# Patient Record
Sex: MALE | Race: WHITE | NOT HISPANIC OR LATINO | Employment: FULL TIME | URBAN - METROPOLITAN AREA
[De-identification: names, ages, dates, MRNs, and addresses within clinical notes are randomized per-mention and may not be internally consistent; named-entity substitution may affect disease eponyms.]

---

## 2024-11-13 RX ORDER — FLECAINIDE ACETATE 50 MG/1
50 TABLET ORAL 2 TIMES DAILY
COMMUNITY
Start: 2024-09-25

## 2024-11-13 RX ORDER — PREDNISONE 10 MG/1
10 TABLET ORAL
COMMUNITY

## 2024-11-13 RX ORDER — ROSUVASTATIN CALCIUM 20 MG/1
1 TABLET, COATED ORAL DAILY
COMMUNITY
Start: 2020-05-15

## 2024-11-13 RX ORDER — METOPROLOL SUCCINATE 25 MG/1
25 TABLET, EXTENDED RELEASE ORAL
COMMUNITY
Start: 2024-08-29 | End: 2025-02-25

## 2024-11-13 NOTE — PROGRESS NOTES
[facility] Heart and Vascular Electrophysiology    Patient Name: Parminder Knox  Patient : 1975    Referred for  AFib    History of Present Illness:  Parminder Knox is a 49 y.o. year old male patient who presents for evaluation of AF    Paroxysmal Atrial fibrillation dx   Chads Vasc 0  On metoprolol    Left atrial tachycardia in   s/p ablation of left posterior wall focal AT in  at the [facility], also   epicardial PAC/AT ablation attempt in  aborted due to no PAC/Ats during study    Mr. Knox was last seen by Dr. Shay in  and at that time no changes were made.   Starting 2024 he had severe symptomatic episodes of PAC's and atrial tachycardia. He was seen by his EP group who orederd a [email or web address] monitor. He presents today with monitor results and is seeking my opinion on the management of his rhythm problems.    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      Social History:  He has no history on file for tobacco use, alcohol use, and drug use.    Family History:  No family history on file.     Allergies:  Patient has no allergy information on record.    ROS:  A 14 point review of systems was done and is negative other than as stated in HPI    Physical Exam  Constitutional:       Appearance: Normal appearance.   HENT:      Head: Normocephalic and atraumatic.   Eyes:      Pupils: Pupils are equal, round,  and reactive to light.   Cardiovascular:      Rate and Rhythm: Normal rate and regular rhythm.      Heart sounds: No murmur heard.     No friction rub. No gallop.   Pulmonary:      Effort: Pulmonary effort is normal.      Breath sounds: Normal breath sounds.   Musculoskeletal:         General: Normal range of motion.      Cervical back: Normal range of motion.   Skin:     General: Skin is warm and dry.   Neurological:      General: No focal deficit present.      Mental Status: He is alert and oriented to person, place, and time.   Psychiatric:         Mood and Affect: Mood normal.         Behavior: Behavior normal.       Cardiac Testing:  ECG 11/18/2024:  Sinus rhythm @ 71 bpm; Normal intervals    I REVIEWED HIS ZIO MONITOR PRINT OUT:    IT SHOWED EPISODES OF AF; AT AND POSSIBLE AFLUTTER DESPITE FLECAINIDE 50 mg BID AND METOPROLOL 25 mg DAILY      ASSESSMENT / PLAN:  WE DISCUSSED THE RISKS AND BENEFITS OF CATHETER ABLATION FOR   PAROXYSMAL AF; AT AND POSSIBLY AFLUTTER.  I INFORMED HIM THAT IDEALLY HE SHOULD BE OFF PREDNISONE FEW WEEKS PRIOR AND POST ABLATION.  HE UNDERSTANDS AND WANTS TO PROCEED.    SCHEDULE AF ABLATION UNDER GENERAL ANESTHESIA   START ELIQUIS 2 WEEKS PRIOR ABLATION  HOLD FLECAINIDE 2 DAYS PRIOR AND ALL OTHER MEDS IN THE MORNING OF ABLATION INCLUDING ELIQUIS.  DC ELIQUIS 3 MO POST ABLATION  CARTO SYSTEM - ANY EP LAB      MD Ciro Shirley Master Clinician of Cardiovascular [place].   [facility] Heart and Vascular [place].   Director of Electrophysiology Center  Professor of Medicine.   [employer].

## 2024-11-15 PROBLEM — I48.0 PAROXYSMAL ATRIAL FIBRILLATION (MULTI): Status: ACTIVE | Noted: 2018-04-16

## 2024-11-15 PROBLEM — E66.811 OBESITY, CLASS I, BMI 30-34.9: Status: ACTIVE | Noted: 2018-04-17

## 2024-11-15 PROBLEM — R00.2 PALPITATIONS: Status: ACTIVE | Noted: 2024-08-29

## 2024-11-15 PROBLEM — I47.10 PAROXYSMAL SUPRAVENTRICULAR TACHYCARDIA (CMS-HCC): Status: ACTIVE | Noted: 2018-04-15

## 2024-11-18 ENCOUNTER — HOSPITAL ENCOUNTER (OUTPATIENT)
Dept: CARDIOLOGY | Facility: CLINIC | Age: 49
Discharge: HOME | End: 2024-11-18
Payer: COMMERCIAL

## 2024-11-18 ENCOUNTER — OFFICE VISIT (OUTPATIENT)
Dept: CARDIOLOGY | Facility: CLINIC | Age: 49
End: 2024-11-18
Payer: COMMERCIAL

## 2024-11-18 VITALS
HEART RATE: 80 BPM | OXYGEN SATURATION: 97 % | WEIGHT: 249 LBS | BODY MASS INDEX: 30.32 KG/M2 | HEIGHT: 76 IN | SYSTOLIC BLOOD PRESSURE: 120 MMHG | DIASTOLIC BLOOD PRESSURE: 82 MMHG

## 2024-11-18 DIAGNOSIS — I48.0 PAROXYSMAL ATRIAL FIBRILLATION (MULTI): Primary | ICD-10-CM

## 2024-11-18 PROCEDURE — 99214 OFFICE O/P EST MOD 30 MIN: CPT | Performed by: INTERNAL MEDICINE

## 2024-11-18 PROCEDURE — 93005 ELECTROCARDIOGRAM TRACING: CPT

## 2024-11-18 PROCEDURE — 3008F BODY MASS INDEX DOCD: CPT | Performed by: INTERNAL MEDICINE

## 2024-11-18 PROCEDURE — 99204 OFFICE O/P NEW MOD 45 MIN: CPT | Performed by: INTERNAL MEDICINE

## 2024-11-18 ASSESSMENT — PAIN SCALES - GENERAL: PAINLEVEL_OUTOF10: 1

## 2024-11-19 LAB
ATRIAL RATE: 71 BPM
P AXIS: 61 DEGREES
P OFFSET: 199 MS
P ONSET: 132 MS
PR INTERVAL: 184 MS
Q ONSET: 224 MS
QRS COUNT: 12 BEATS
QRS DURATION: 98 MS
QT INTERVAL: 390 MS
QTC CALCULATION(BAZETT): 423 MS
QTC FREDERICIA: 412 MS
R AXIS: 18 DEGREES
T AXIS: 31 DEGREES
T OFFSET: 419 MS
VENTRICULAR RATE: 71 BPM

## 2024-11-29 ENCOUNTER — HOSPITAL ENCOUNTER (OUTPATIENT)
Facility: HOSPITAL | Age: 49
Setting detail: OUTPATIENT SURGERY
End: 2024-11-29
Attending: INTERNAL MEDICINE | Admitting: INTERNAL MEDICINE
Payer: COMMERCIAL

## 2024-11-29 DIAGNOSIS — I48.0 PAROXYSMAL ATRIAL FIBRILLATION (MULTI): ICD-10-CM

## 2025-01-27 DIAGNOSIS — I48.0 PAROXYSMAL ATRIAL FIBRILLATION (MULTI): Primary | ICD-10-CM

## 2025-01-27 DIAGNOSIS — Z01.818 PREOP TESTING: ICD-10-CM

## 2025-01-30 DIAGNOSIS — I48.0 PAROXYSMAL ATRIAL FIBRILLATION (MULTI): Primary | ICD-10-CM

## 2025-01-30 DIAGNOSIS — I48.0 PAROXYSMAL ATRIAL FIBRILLATION (MULTI): ICD-10-CM

## 2025-02-07 LAB
BUN SERPL-MCNC: 16 MG/DL (ref 7–25)
BUN/CREAT SERPL: ABNORMAL (CALC) (ref 6–22)
CALCIUM SERPL-MCNC: 9.5 MG/DL (ref 8.6–10.3)
CHLORIDE SERPL-SCNC: 105 MMOL/L (ref 98–110)
CO2 SERPL-SCNC: 27 MMOL/L (ref 20–32)
CREAT SERPL-MCNC: 0.98 MG/DL (ref 0.7–1.3)
EGFRCR SERPLBLD CKD-EPI 2021: 93 ML/MIN/1.73M2
ERYTHROCYTE [DISTWIDTH] IN BLOOD BY AUTOMATED COUNT: 12.8 % (ref 11–15)
GLUCOSE SERPL-MCNC: 104 MG/DL (ref 65–99)
HCT VFR BLD AUTO: 47.2 % (ref 38.5–50)
HGB BLD-MCNC: 15.8 G/DL (ref 13.2–17.1)
MCH RBC QN AUTO: 30.3 PG (ref 27–33)
MCHC RBC AUTO-ENTMCNC: 33.5 G/DL (ref 32–36)
MCV RBC AUTO: 90.6 FL (ref 80–100)
PLATELET # BLD AUTO: 255 THOUSAND/UL (ref 140–400)
PMV BLD REES-ECKER: 10.6 FL (ref 7.5–12.5)
POTASSIUM SERPL-SCNC: 3.8 MMOL/L (ref 3.5–5.3)
RBC # BLD AUTO: 5.21 MILLION/UL (ref 4.2–5.8)
SODIUM SERPL-SCNC: 140 MMOL/L (ref 135–146)
WBC # BLD AUTO: 7.1 THOUSAND/UL (ref 3.8–10.8)

## 2025-02-10 NOTE — PROGRESS NOTES
Pharmacy Medication History Review    Parminder Knox is a 50 y.o. male who is planned to be admitted for No Principal Problem: There is no principal problem currently on the Problem List. Please update the Problem List and refresh.. Pharmacy called the patient prior to their scheduled procedure and reviewed the patient's upbvi-[id number] medications for accuracy.    Medications ADDED:  none  Medications CHANGED:  none  Medications REMOVED:   Flecainide 50mg  Prednisone 10mg  Rosuvastatin 20mg    Please review updated prior to admission medication list and comments regarding how patient may be taking medications differently by going to Admission tab --> Admission Orders --> Admit Orders / Review prior to admission medications.     Preferred pharmacy, last doses of medications, and allergies to be confirmed with patient by nursing the day of procedure.     Sources used to complete the med history include:  [facility]  Pharmacy dispense history  Patient interview  Chart Review  Care Everywhere     Below are additional concerns with the patient's PTA list.  Patient states they are taking #1 tablet of metoprolol succinate 25mg daily. L.F. 08/29/24 #90/90d. There is no recent fill history to verify dosage    Lucia Simms CPhT  Please reach out via Secure Chat for questions

## 2025-02-11 ENCOUNTER — ANESTHESIA (OUTPATIENT)
Dept: CARDIOLOGY | Facility: HOSPITAL | Age: 50
End: 2025-02-11
Payer: MEDICARE

## 2025-02-11 ENCOUNTER — APPOINTMENT (OUTPATIENT)
Dept: CARDIOLOGY | Facility: HOSPITAL | Age: 50
End: 2025-02-11
Payer: MEDICARE

## 2025-02-11 ENCOUNTER — ANESTHESIA EVENT (OUTPATIENT)
Dept: CARDIOLOGY | Facility: HOSPITAL | Age: 50
End: 2025-02-11
Payer: MEDICARE

## 2025-02-11 ENCOUNTER — HOSPITAL ENCOUNTER (OUTPATIENT)
Facility: HOSPITAL | Age: 50
Discharge: HOME | End: 2025-02-12
Attending: INTERNAL MEDICINE | Admitting: INTERNAL MEDICINE
Payer: MEDICARE

## 2025-02-11 DIAGNOSIS — I48.0 PAROXYSMAL ATRIAL FIBRILLATION (MULTI): ICD-10-CM

## 2025-02-11 DIAGNOSIS — Z86.79 S/P ABLATION OF ATRIAL FIBRILLATION: Primary | ICD-10-CM

## 2025-02-11 DIAGNOSIS — Z98.890 S/P ABLATION OF ATRIAL FIBRILLATION: Primary | ICD-10-CM

## 2025-02-11 PROBLEM — I48.91 ATRIAL FIBRILLATION (MULTI): Status: ACTIVE | Noted: 2025-02-11

## 2025-02-11 LAB
ACT BLD: 208 SEC (ref 82–174)
ACT BLD: 247 SEC (ref 82–174)
ACT BLD: 285 SEC (ref 82–174)
ACT BLD: 326 SEC (ref 82–174)

## 2025-02-11 PROCEDURE — 2500000001 HC RX 250 WO HCPCS SELF ADMINISTERED DRUGS (ALT 637 FOR MEDICARE OP): Performed by: STUDENT IN AN ORGANIZED HEALTH CARE EDUCATION/TRAINING PROGRAM

## 2025-02-11 PROCEDURE — G0269 OCCLUSIVE DEVICE IN VEIN ART: HCPCS | Performed by: INTERNAL MEDICINE

## 2025-02-11 PROCEDURE — 85347 COAGULATION TIME ACTIVATED: CPT

## 2025-02-11 PROCEDURE — 2500000004 HC RX 250 GENERAL PHARMACY W/ HCPCS (ALT 636 FOR OP/ED)

## 2025-02-11 PROCEDURE — 2500000002 HC RX 250 W HCPCS SELF ADMINISTERED DRUGS (ALT 637 FOR MEDICARE OP, ALT 636 FOR OP/ED): Performed by: STUDENT IN AN ORGANIZED HEALTH CARE EDUCATION/TRAINING PROGRAM

## 2025-02-11 PROCEDURE — 2500000001 HC RX 250 WO HCPCS SELF ADMINISTERED DRUGS (ALT 637 FOR MEDICARE OP)

## 2025-02-11 PROCEDURE — C1732 CATH, EP, DIAG/ABL, 3D/VECT: HCPCS | Performed by: INTERNAL MEDICINE

## 2025-02-11 PROCEDURE — C1766 INTRO/SHEATH,STRBLE,NON-PEEL: HCPCS | Performed by: INTERNAL MEDICINE

## 2025-02-11 PROCEDURE — 85347 COAGULATION TIME ACTIVATED: CPT | Performed by: INTERNAL MEDICINE

## 2025-02-11 PROCEDURE — 2720000007 HC OR 272 NO HCPCS: Performed by: INTERNAL MEDICINE

## 2025-02-11 PROCEDURE — C1731 CATH, EP, 20 OR MORE ELEC: HCPCS | Performed by: INTERNAL MEDICINE

## 2025-02-11 PROCEDURE — C1760 CLOSURE DEV, VASC: HCPCS | Performed by: INTERNAL MEDICINE

## 2025-02-11 PROCEDURE — A93656 PR EPHYS EVL TRNSPTL TX ATRIAL FIB ISOLAT PULM VEIN: Performed by: STUDENT IN AN ORGANIZED HEALTH CARE EDUCATION/TRAINING PROGRAM

## 2025-02-11 PROCEDURE — C1759 CATH, INTRA ECHOCARDIOGRAPHY: HCPCS | Performed by: INTERNAL MEDICINE

## 2025-02-11 PROCEDURE — A93656 PR EPHYS EVL TRNSPTL TX ATRIAL FIB ISOLAT PULM VEIN

## 2025-02-11 PROCEDURE — 7100000011 HC EXTENDED STAY RECOVERY HOURLY - NURSING UNIT

## 2025-02-11 PROCEDURE — 3700000002 HC GENERAL ANESTHESIA TIME - EACH INCREMENTAL 1 MINUTE: Performed by: INTERNAL MEDICINE

## 2025-02-11 PROCEDURE — 2780000003 HC OR 278 NO HCPCS: Performed by: INTERNAL MEDICINE

## 2025-02-11 PROCEDURE — 7100000009 HC PHASE TWO TIME - INITIAL BASE CHARGE: Performed by: INTERNAL MEDICINE

## 2025-02-11 PROCEDURE — 2500000004 HC RX 250 GENERAL PHARMACY W/ HCPCS (ALT 636 FOR OP/ED): Performed by: STUDENT IN AN ORGANIZED HEALTH CARE EDUCATION/TRAINING PROGRAM

## 2025-02-11 PROCEDURE — 93005 ELECTROCARDIOGRAM TRACING: CPT

## 2025-02-11 PROCEDURE — 76937 US GUIDE VASCULAR ACCESS: CPT | Performed by: INTERNAL MEDICINE

## 2025-02-11 PROCEDURE — 93010 ELECTROCARDIOGRAM REPORT: CPT | Performed by: INTERNAL MEDICINE

## 2025-02-11 PROCEDURE — 93656 COMPRE EP EVAL ABLTJ ATR FIB: CPT | Performed by: INTERNAL MEDICINE

## 2025-02-11 PROCEDURE — 36620 INSERTION CATHETER ARTERY: CPT

## 2025-02-11 PROCEDURE — 3700000001 HC GENERAL ANESTHESIA TIME - INITIAL BASE CHARGE: Performed by: INTERNAL MEDICINE

## 2025-02-11 PROCEDURE — 7100000010 HC PHASE TWO TIME - EACH INCREMENTAL 1 MINUTE: Performed by: INTERNAL MEDICINE

## 2025-02-11 RX ORDER — LIDOCAINE IN NACL,ISO-OSMOT/PF 30 MG/3 ML
0.1 SYRINGE (ML) INJECTION ONCE
Status: CANCELLED | OUTPATIENT
Start: 2025-02-11 | End: 2025-02-11

## 2025-02-11 RX ORDER — MECLIZINE HYDROCHLORIDE 25 MG/1
25 TABLET ORAL ONCE
Status: COMPLETED | OUTPATIENT
Start: 2025-02-11 | End: 2025-02-11

## 2025-02-11 RX ORDER — ONDANSETRON HYDROCHLORIDE 2 MG/ML
4 INJECTION, SOLUTION INTRAVENOUS ONCE AS NEEDED
Status: CANCELLED | OUTPATIENT
Start: 2025-02-11

## 2025-02-11 RX ORDER — FENTANYL CITRATE 50 UG/ML
12.5 INJECTION, SOLUTION INTRAMUSCULAR; INTRAVENOUS EVERY 5 MIN PRN
Status: CANCELLED | OUTPATIENT
Start: 2025-02-11

## 2025-02-11 RX ORDER — FENTANYL CITRATE 50 UG/ML
INJECTION, SOLUTION INTRAMUSCULAR; INTRAVENOUS AS NEEDED
Status: DISCONTINUED | OUTPATIENT
Start: 2025-02-11 | End: 2025-02-11

## 2025-02-11 RX ORDER — FENTANYL CITRATE 50 UG/ML
25 INJECTION, SOLUTION INTRAMUSCULAR; INTRAVENOUS EVERY 5 MIN PRN
Status: CANCELLED | OUTPATIENT
Start: 2025-02-11

## 2025-02-11 RX ORDER — OXYCODONE AND ACETAMINOPHEN 5; 325 MG/1; MG/1
1 TABLET ORAL ONCE
Status: COMPLETED | OUTPATIENT
Start: 2025-02-11 | End: 2025-02-11

## 2025-02-11 RX ORDER — HEPARIN SODIUM 10000 [USP'U]/100ML
INJECTION, SOLUTION INTRAVENOUS CONTINUOUS PRN
Status: DISCONTINUED | OUTPATIENT
Start: 2025-02-11 | End: 2025-02-11 | Stop reason: HOSPADM

## 2025-02-11 RX ORDER — PHENYLEPHRINE 10 MG/250 ML(40 MCG/ML)IN 0.9 % SOD.CHLORIDE INTRAVENOUS
CONTINUOUS PRN
Status: DISCONTINUED | OUTPATIENT
Start: 2025-02-11 | End: 2025-02-11

## 2025-02-11 RX ORDER — FENTANYL CITRATE 50 UG/ML
50 INJECTION, SOLUTION INTRAMUSCULAR; INTRAVENOUS EVERY 5 MIN PRN
Status: CANCELLED | OUTPATIENT
Start: 2025-02-11

## 2025-02-11 RX ORDER — PHENYLEPHRINE HCL IN 0.9% NACL 0.4MG/10ML
SYRINGE (ML) INTRAVENOUS AS NEEDED
Status: DISCONTINUED | OUTPATIENT
Start: 2025-02-11 | End: 2025-02-11

## 2025-02-11 RX ORDER — ROCURONIUM BROMIDE 10 MG/ML
INJECTION, SOLUTION INTRAVENOUS AS NEEDED
Status: DISCONTINUED | OUTPATIENT
Start: 2025-02-11 | End: 2025-02-11

## 2025-02-11 RX ORDER — ONDANSETRON HYDROCHLORIDE 2 MG/ML
INJECTION, SOLUTION INTRAVENOUS AS NEEDED
Status: DISCONTINUED | OUTPATIENT
Start: 2025-02-11 | End: 2025-02-11

## 2025-02-11 RX ORDER — ACETAMINOPHEN 325 MG/1
650 TABLET ORAL EVERY 6 HOURS PRN
Status: DISCONTINUED | OUTPATIENT
Start: 2025-02-11 | End: 2025-02-12 | Stop reason: HOSPADM

## 2025-02-11 RX ORDER — PANTOPRAZOLE SODIUM 40 MG/1
40 TABLET, DELAYED RELEASE ORAL
Status: DISCONTINUED | OUTPATIENT
Start: 2025-02-12 | End: 2025-02-12 | Stop reason: HOSPADM

## 2025-02-11 RX ORDER — PROTAMINE SULFATE 10 MG/ML
INJECTION, SOLUTION INTRAVENOUS AS NEEDED
Status: DISCONTINUED | OUTPATIENT
Start: 2025-02-11 | End: 2025-02-11

## 2025-02-11 RX ORDER — LIDOCAINE HYDROCHLORIDE 20 MG/ML
INJECTION, SOLUTION INFILTRATION; PERINEURAL AS NEEDED
Status: DISCONTINUED | OUTPATIENT
Start: 2025-02-11 | End: 2025-02-11

## 2025-02-11 RX ORDER — PROPOFOL 10 MG/ML
INJECTION, EMULSION INTRAVENOUS AS NEEDED
Status: DISCONTINUED | OUTPATIENT
Start: 2025-02-11 | End: 2025-02-11

## 2025-02-11 RX ORDER — SODIUM CHLORIDE, SODIUM LACTATE, POTASSIUM CHLORIDE, CALCIUM CHLORIDE 600; 310; 30; 20 MG/100ML; MG/100ML; MG/100ML; MG/100ML
100 INJECTION, SOLUTION INTRAVENOUS CONTINUOUS
Status: CANCELLED | OUTPATIENT
Start: 2025-02-11 | End: 2025-02-12

## 2025-02-11 RX ORDER — MIDAZOLAM HYDROCHLORIDE 1 MG/ML
INJECTION INTRAMUSCULAR; INTRAVENOUS AS NEEDED
Status: DISCONTINUED | OUTPATIENT
Start: 2025-02-11 | End: 2025-02-11

## 2025-02-11 RX ORDER — METOPROLOL SUCCINATE 25 MG/1
25 TABLET, EXTENDED RELEASE ORAL
Status: DISCONTINUED | OUTPATIENT
Start: 2025-02-12 | End: 2025-02-12 | Stop reason: HOSPADM

## 2025-02-11 RX ORDER — HEPARIN SODIUM 1000 [USP'U]/ML
INJECTION, SOLUTION INTRAVENOUS; SUBCUTANEOUS AS NEEDED
Status: DISCONTINUED | OUTPATIENT
Start: 2025-02-11 | End: 2025-02-11 | Stop reason: HOSPADM

## 2025-02-11 RX ORDER — DROPERIDOL 2.5 MG/ML
0.62 INJECTION, SOLUTION INTRAMUSCULAR; INTRAVENOUS ONCE AS NEEDED
Status: CANCELLED | OUTPATIENT
Start: 2025-02-11

## 2025-02-11 RX ADMIN — ONDANSETRON 4 MG: 2 INJECTION INTRAMUSCULAR; INTRAVENOUS at 15:04

## 2025-02-11 RX ADMIN — LIDOCAINE HYDROCHLORIDE 100 MG: 20 INJECTION, SOLUTION INFILTRATION; PERINEURAL at 12:15

## 2025-02-11 RX ADMIN — PHENYLEPHRINE-NACL IV SOLUTION 10 MG/250ML-0.9% 0.2 MCG/KG/MIN: 10-0.9/25 SOLUTION at 12:40

## 2025-02-11 RX ADMIN — FENTANYL CITRATE 100 MCG: 50 INJECTION, SOLUTION INTRAMUSCULAR; INTRAVENOUS at 12:16

## 2025-02-11 RX ADMIN — APIXABAN 5 MG: 5 TABLET, FILM COATED ORAL at 20:37

## 2025-02-11 RX ADMIN — SUGAMMADEX 400 MG: 100 INJECTION, SOLUTION INTRAVENOUS at 15:17

## 2025-02-11 RX ADMIN — ROCURONIUM 100 MG: 50 INJECTION, SOLUTION INTRAVENOUS at 12:17

## 2025-02-11 RX ADMIN — PROTAMINE SULFATE 30 MG: 10 INJECTION, SOLUTION INTRAVENOUS at 15:00

## 2025-02-11 RX ADMIN — Medication 100 MCG: at 12:40

## 2025-02-11 RX ADMIN — Medication 120 MCG: at 12:16

## 2025-02-11 RX ADMIN — PROPOFOL 200 MG: 10 INJECTION, EMULSION INTRAVENOUS at 12:16

## 2025-02-11 RX ADMIN — MIDAZOLAM HYDROCHLORIDE 2 MG: 1 INJECTION, SOLUTION INTRAMUSCULAR; INTRAVENOUS at 12:09

## 2025-02-11 RX ADMIN — Medication 80 MCG: at 12:50

## 2025-02-11 RX ADMIN — ACETAMINOPHEN 650 MG: 325 TABLET ORAL at 17:47

## 2025-02-11 RX ADMIN — Medication 80 MCG: at 15:15

## 2025-02-11 RX ADMIN — DEXAMETHASONE SODIUM PHOSPHATE 6 MG: 4 INJECTION INTRA-ARTICULAR; INTRALESIONAL; INTRAMUSCULAR; INTRAVENOUS; SOFT TISSUE at 12:32

## 2025-02-11 RX ADMIN — OXYCODONE HYDROCHLORIDE AND ACETAMINOPHEN 1 TABLET: 5; 325 TABLET ORAL at 22:00

## 2025-02-11 RX ADMIN — SODIUM CHLORIDE, SODIUM LACTATE, POTASSIUM CHLORIDE, AND CALCIUM CHLORIDE: 600; 310; 30; 20 INJECTION, SOLUTION INTRAVENOUS at 12:09

## 2025-02-11 RX ADMIN — MECLIZINE HYDROCHLORIDE 25 MG: 25 TABLET ORAL at 21:14

## 2025-02-11 RX ADMIN — ROCURONIUM 20 MG: 50 INJECTION, SOLUTION INTRAVENOUS at 13:39

## 2025-02-11 SDOH — SOCIAL STABILITY: SOCIAL INSECURITY
WITHIN THE LAST YEAR, HAVE YOU BEEN RAPED OR FORCED TO HAVE ANY KIND OF SEXUAL ACTIVITY BY YOUR PARTNER OR EX-PARTNER?: NO

## 2025-02-11 SDOH — ECONOMIC STABILITY: FOOD INSECURITY: WITHIN THE PAST 12 MONTHS, YOU WORRIED THAT YOUR FOOD WOULD RUN OUT BEFORE YOU GOT THE MONEY TO BUY MORE.: NEVER TRUE

## 2025-02-11 SDOH — SOCIAL STABILITY: SOCIAL INSECURITY: WITHIN THE LAST YEAR, HAVE YOU BEEN AFRAID OF YOUR PARTNER OR EX-PARTNER?: NO

## 2025-02-11 SDOH — SOCIAL STABILITY: SOCIAL INSECURITY: DO YOU FEEL UNSAFE GOING BACK TO THE PLACE WHERE YOU ARE LIVING?: NO

## 2025-02-11 SDOH — SOCIAL STABILITY: SOCIAL INSECURITY: HAS ANYONE EVER THREATENED TO HURT YOUR FAMILY OR YOUR PETS?: NO

## 2025-02-11 SDOH — SOCIAL STABILITY: SOCIAL INSECURITY: ARE YOU OR HAVE YOU BEEN THREATENED OR ABUSED PHYSICALLY, EMOTIONALLY, OR SEXUALLY BY ANYONE?: NO

## 2025-02-11 SDOH — SOCIAL STABILITY: SOCIAL INSECURITY: HAVE YOU HAD THOUGHTS OF HARMING ANYONE ELSE?: NO

## 2025-02-11 SDOH — SOCIAL STABILITY: SOCIAL INSECURITY: WITHIN THE LAST YEAR, HAVE YOU BEEN HUMILIATED OR EMOTIONALLY ABUSED IN OTHER WAYS BY YOUR PARTNER OR EX-PARTNER?: NO

## 2025-02-11 SDOH — SOCIAL STABILITY: SOCIAL INSECURITY: DO YOU FEEL ANYONE HAS EXPLOITED OR TAKEN ADVANTAGE OF YOU FINANCIALLY OR OF YOUR PERSONAL PROPERTY?: NO

## 2025-02-11 SDOH — SOCIAL STABILITY: SOCIAL INSECURITY
WITHIN THE LAST YEAR, HAVE YOU BEEN KICKED, HIT, SLAPPED, OR OTHERWISE PHYSICALLY HURT BY YOUR PARTNER OR EX-PARTNER?: NO

## 2025-02-11 SDOH — SOCIAL STABILITY: SOCIAL INSECURITY: WERE YOU ABLE TO COMPLETE ALL THE BEHAVIORAL HEALTH SCREENINGS?: YES

## 2025-02-11 SDOH — ECONOMIC STABILITY: INCOME INSECURITY: IN THE PAST 12 MONTHS HAS THE ELECTRIC, GAS, OIL, OR WATER COMPANY THREATENED TO SHUT OFF SERVICES IN YOUR HOME?: NO

## 2025-02-11 SDOH — SOCIAL STABILITY: SOCIAL INSECURITY: DOES ANYONE TRY TO KEEP YOU FROM HAVING/CONTACTING OTHER FRIENDS OR DOING THINGS OUTSIDE YOUR HOME?: NO

## 2025-02-11 SDOH — ECONOMIC STABILITY: FOOD INSECURITY: WITHIN THE PAST 12 MONTHS, THE FOOD YOU BOUGHT JUST DIDN'T LAST AND YOU DIDN'T HAVE MONEY TO GET MORE.: NEVER TRUE

## 2025-02-11 SDOH — SOCIAL STABILITY: SOCIAL INSECURITY: HAVE YOU HAD ANY THOUGHTS OF HARMING ANYONE ELSE?: NO

## 2025-02-11 SDOH — SOCIAL STABILITY: SOCIAL INSECURITY: ARE THERE ANY APPARENT SIGNS OF INJURIES/BEHAVIORS THAT COULD BE RELATED TO ABUSE/NEGLECT?: NO

## 2025-02-11 SDOH — SOCIAL STABILITY: SOCIAL INSECURITY: ABUSE: ADULT

## 2025-02-11 ASSESSMENT — PAIN SCALES - GENERAL
PAINLEVEL_OUTOF10: 1
PAINLEVEL_OUTOF10: 0 - NO PAIN
PAINLEVEL_OUTOF10: 5 - MODERATE PAIN
PAINLEVEL_OUTOF10: 4
PAINLEVEL_OUTOF10: 3

## 2025-02-11 ASSESSMENT — COGNITIVE AND FUNCTIONAL STATUS - GENERAL
PATIENT BASELINE BEDBOUND: NO
MOBILITY SCORE: 24
DAILY ACTIVITIY SCORE: 24
MOBILITY SCORE: 24
MOBILITY SCORE: 24
DAILY ACTIVITIY SCORE: 24
DAILY ACTIVITIY SCORE: 24

## 2025-02-11 ASSESSMENT — PATIENT HEALTH QUESTIONNAIRE - PHQ9
2. FEELING DOWN, DEPRESSED OR HOPELESS: NOT AT ALL
SUM OF ALL RESPONSES TO PHQ9 QUESTIONS 1 & 2: 0
1. LITTLE INTEREST OR PLEASURE IN DOING THINGS: NOT AT ALL

## 2025-02-11 ASSESSMENT — COLUMBIA-SUICIDE SEVERITY RATING SCALE - C-SSRS
6. HAVE YOU EVER DONE ANYTHING, STARTED TO DO ANYTHING, OR PREPARED TO DO ANYTHING TO END YOUR LIFE?: NO
2. HAVE YOU ACTUALLY HAD ANY THOUGHTS OF KILLING YOURSELF?: NO
1. IN THE PAST MONTH, HAVE YOU WISHED YOU WERE DEAD OR WISHED YOU COULD GO TO SLEEP AND NOT WAKE UP?: NO

## 2025-02-11 ASSESSMENT — LIFESTYLE VARIABLES
HOW OFTEN DO YOU HAVE A DRINK CONTAINING ALCOHOL: NEVER
AUDIT-C TOTAL SCORE: 0
HOW MANY STANDARD DRINKS CONTAINING ALCOHOL DO YOU HAVE ON A TYPICAL DAY: PATIENT DOES NOT DRINK
SKIP TO QUESTIONS 9-10: 1
AUDIT-C TOTAL SCORE: 0
HOW OFTEN DO YOU HAVE 6 OR MORE DRINKS ON ONE OCCASION: NEVER

## 2025-02-11 ASSESSMENT — PAIN - FUNCTIONAL ASSESSMENT
PAIN_FUNCTIONAL_ASSESSMENT: 0-10
PAIN_FUNCTIONAL_ASSESSMENT: 0-10

## 2025-02-11 ASSESSMENT — ACTIVITIES OF DAILY LIVING (ADL)
PATIENT'S MEMORY ADEQUATE TO SAFELY COMPLETE DAILY ACTIVITIES?: YES
ADEQUATE_TO_COMPLETE_ADL: YES
HEARING - LEFT EAR: FUNCTIONAL
BATHING: INDEPENDENT
GROOMING: INDEPENDENT
TOILETING: INDEPENDENT
FEEDING YOURSELF: INDEPENDENT
WALKS IN HOME: INDEPENDENT
JUDGMENT_ADEQUATE_SAFELY_COMPLETE_DAILY_ACTIVITIES: YES
LACK_OF_TRANSPORTATION: NO
HEARING - RIGHT EAR: FUNCTIONAL
DRESSING YOURSELF: INDEPENDENT

## 2025-02-11 ASSESSMENT — PAIN DESCRIPTION - LOCATION: LOCATION: CHEST

## 2025-02-11 NOTE — ANESTHESIA PREPROCEDURE EVALUATION
Patient: Parminder Knox    Procedure Information       Date/Time: 02/11/25 1230    Procedure: Ablation A-Fib Paroxysmal - CARTO/ANY EP LAB  GENERAL ANESTHESIA  PATIENT HAS [employer], PATIENT WILL STAY OVERNIGHT DUE TO INSURANCE    Location: [facility]IP[facility] / Virtual [facility] MAT 3521 Cardiac Cath Lab    Providers: Tereso Santamaria MD            Relevant Problems   Cardiac   (+) Mixed hyperlipidemia   (+) Paroxysmal atrial fibrillation (Multi)   (+) Paroxysmal supraventricular tachycardia (CMS-HCC)       Clinical information reviewed:    Allergies  Meds   Med Hx  Surg Hx   Fam Hx  Soc Hx        NPO Detail:  NPO/Void Status  Date of Last Liquid: 02/10/25  Time of Last Liquid: 2300  Date of Last Solid: 02/10/25  Time of Last Solid: 2300         Physical Exam    Airway  Mallampati: I  TM distance: >3 FB     Cardiovascular    Dental    Pulmonary    Abdominal        Anesthesia Plan    History of general anesthesia?: yes  History of complications of general anesthesia?: no    ASA 3     general   (+PONV)  intravenous induction   Anesthetic plan and risks discussed with patient.    Plan discussed with CRNA.

## 2025-02-11 NOTE — ANESTHESIA POSTPROCEDURE EVALUATION
Patient: Parminder Knox    Procedure Summary       Date: 02/11/25 Room / Location: [facility] / Virtual [facility] [place]29 Cardiac Cath Lab    Anesthesia Start: 1205 Anesthesia Stop: 1541    Procedure: Ablation A-Fib Paroxysmal (Right: Groin) Diagnosis:       Paroxysmal atrial fibrillation (Multi)      (Paroxysmal atrial fibrillation I48.0)    Providers: Tereso Santamaria MD Responsible Provider: Ernesto Carlson MD    Anesthesia Type: general ASA Status: 3            Anesthesia Type: general    Vitals Value Taken Time   /56 02/11/25 1541   Temp 36.1 °C (97 °F) 02/11/25 1541   Pulse 72 02/11/25 1541   Resp 14 02/11/25 1541   SpO2 99 % 02/11/25 1541       Anesthesia Post Evaluation    Patient location during evaluation: PACU  Patient participation: complete - patient participated  Level of consciousness: awake  Pain management: adequate  Multimodal analgesia pain management approach  Airway patency: patent  Cardiovascular status: acceptable  Respiratory status: acceptable  Hydration status: acceptable  Postoperative Nausea and Vomiting: none        There were no known notable events for this encounter.

## 2025-02-11 NOTE — CARE PLAN
Patient safe and stable, no issues. Arrived from EP lab to T5 post ablation. No bleeding from site. Some tenderness, tylenol given.       Problem: Arrythmia/Dysrhythmia  Goal: Lab values return to normal range  Outcome: Progressing  Goal: No evidence of post procedure complications  Outcome: Progressing  Goal: Promote self management  Outcome: Progressing  Goal: Serial ECG will return to baseline  Outcome: Progressing  Goal: Verbalize understanding of procedures/devices  Outcome: Progressing  Goal: Vital signs return to baseline  Outcome: Progressing  Goal: Care and maintenance of device (specify)  Outcome: Progressing     Problem: Cardiac catheterization  Goal: Free from dysrhythmias  Outcome: Progressing  Goal: Free from pain  Outcome: Progressing  Goal: No evidence of post procedure complications  Outcome: Progressing  Goal: Promote self management  Outcome: Progressing  Goal: Verbalize understanding of procedure  Outcome: Progressing  Goal: Care and maintenance of device (specify)  Outcome: Progressing

## 2025-02-11 NOTE — DISCHARGE INSTRUCTIONS
INSTRUCTIONS AFTER ABLATION PROCEDURE:    * You will need to continue blood thinner (Eliquis every 12 hours) until instructed otherwise. It is important not to interrupt blood thinner for any reason (other than an emergency) during the first 30 days after ablation.    * You will be on Pantoprazole (a heartburn medicine) for 4 weeks to protect the esophagus as it can become irritated with ablation. It is very important that you take this medication.    * All other medications will generally remain the same unless you are told otherwise.  Resume taking your home medications today (including blood thinner) as listed on the discharge instructions.    * In the first week post-ablation you should take it easy. No heavy lifting or heavy exercise, no treadmill. You can use the stairs if needed but go slowly and minimize the number of times up and down.    * Some minor bruising is common at each groin access site with minor soreness as if you had banged the area. Bruising may occasionally be seen to extend down the leg. This is normal as is an occasional small quarter sized bump in the area. If larger swelling or more significant pain occurs at the area, please contact the office or go the nearest Emergency Room.    * You may have some minor chest pain for the next week or so. The pain will often worsen with a deep breath and be better when leaning forward. This is pericardial chest pain from the ablation and is generally not of concern. It should resolve within a week although it might increase for a day or so after the ablation.    * If you develop unexplained fevers exceeding 100 degrees anytime within the first 3 weeks post-ablation, you need to contact the office. Low grade fevers of around 99 degrees are common in the first day or so post-ablation.    * Atrial fibrillation (AFib) can recur in all patients who undergo this ablation for up to 4-8 weeks post-ablation. The ablation itself can cause inflammation  (pericarditis) in the atria and this can cause AFib. Some patients will actually experience an increased amount of atrial arrhythmia early after ablation. Approximately 1/3 of patients will have this early recurrence of AFib. Medications should be continued and your heart rate controlled. Nothing else needs be done initially except waiting as in many cases these episodes of AFib will prove self limited.    * Continue to follow up with your primary care physician, primary cardiologist, and any other specialists you normally see.    * No driving for 2 days post procedure (IF you were driving prior to procedure)    *Diet: Heart healthy    Call Provider If:  Breathing faster than normal.     Fever of 100.4 F (38 C) or higher.     Chills.     Any new concerning symptoms.     Passing out.     Patient Instructions, Next 24 hours:  DO NOT drive a car, operate machinery or power tools.  It is recommended that a responsible adult be with you for the first 24 hours.     DO NOT drink any alcoholic drinks or take any non-prescriptive medications that contain alcohol for the first 24 hours.     DO NOT make any important decisions for the first 24 hours.    Activity:  You are advised to go directly home from the hospital.     DO NOT lift anything heavier than 10 pounds for one week, this allows for proper healing of the groin.     No excessive exercise or treadmill use for one week. You may walk and do stairs, slowly.     No sexual activities for 24 hours after you arrive home.    Wound Care:  If slight bleeding should occur at groin site, lie down and have someone apply firm pressure just above the puncture site for 5 minutes.  If it continues or is profuse, call 911. Always notify your doctor if bleeding occurs.     Keep site clean and dry. Let air dry or you may use a simple bandaid.     Gently cleanse the puncture site in your groin with soap and water only.     You may experience some tenderness, bruising or minimal  inflammation.  If you have any concerns, you may contact the EP Lab or if any of these symptoms become excessive, contact your electrophysiologist or go to the emergency room.     No tub baths, soaking, hot tubs, or swimming for one week.     May shower the next day after your procedure.    Other Instructions:  If you have any questions about the effects of the sedative drugs or groin care, please call the physician who performed your procedure.    FOLLOW UP:  1) Primary care physician 2 weeks--call to schedule    2) Nancy Rainey CNP ( Electrophysiology) 1 month after ablation as scheduled

## 2025-02-11 NOTE — H&P
History Of Present Illness  Parminder Knox is a 50 y.o. male with PMH of paroxysmal Afib, left Atach (s/p left posterior wall ablation in 2009 at [facility] and epicardial PAC/AT ablation attempt in 2010 that was aborted due to no PAC/AT during study) who is here for Afib ablation (PVI).     He reports that he has almost daily symptoms of palpitations that last between few mins to few hours. No SOB or syncope. He started taking apixaban two wks ago.      Past Medical History  History reviewed. No pertinent past medical history.    Surgical History  Past Surgical History:   Procedure Laterality Date    OTHER SURGICAL HISTORY  05/11/2020    Knee arthroscopy        Social History  He has no history on file for tobacco use, alcohol use, and drug use.    Family History  No family history on file.     Allergies  Atorvastatin and House dust mite    Review of Systems  Constitutional:  Negative for chills and fever.   HENT:  Negative for congestion and rhinorrhea.    Eyes:  Negative for pain and redness.   Respiratory:  Negative for shortness of breath and wheezing.    Cardiovascular:  Negative for chest pain. POSITIVE for palpitations.   Gastrointestinal:  Negative for abdominal pain, constipation and diarrhea.   Genitourinary:  Negative for dysuria and hematuria.   Musculoskeletal:  Negative for back pain and neck pain.   Neurological:  Negative for dizziness and headaches.   Psychiatric/Behavioral:  Negative for agitation and confusion.         Physical Exam  Gen: awake and alert, AAOx3  HEENT: normocephalic.  CV: RRR. No murmurs, gallops, rubs  Pulm: clear to auscultation bilaterally. No coarse lung sounds  Abd: soft, non-distended. Normal active bowel sounds  Ext: warm and well-perfused. No LE edema  Psych: appropriate affect  Neuro: grossly intact sensation and motor functions.       Last Recorded Vitals  There were no vitals taken for this visit.    Relevant Results           Assessment/Plan   Assessment &  Plan    Parminder Knox is a 50 y.o. male with PMH of paroxysmal Afib, left Atach (s/p left posterior wall ablation in 2009 at [facility] and epicardial PAC/AT ablation attempt in 2010 that was aborted due to no PAC/AT during study) who is here for Afib ablation (PVI).     Plan: Afib ablation with PVI and possible flutter/AT ablation under general anesthesia.       I spent 30 minutes in the professional and overall care of this patient.      Milli Mahajan MD

## 2025-02-11 NOTE — ANESTHESIA PROCEDURE NOTES
Arterial Line:    Date/Time: 2/11/2025 12:20 PM    Staffing  Performed: CRNA   Authorized by: Yordan Amin MD    Performed by: BETO Shultz-CRNA    An arterial line was placed. in the OR for the following indication(s): continuous blood pressure monitoring and blood sampling needed.    A 20 gauge (size), 1 and 3/4 inch (length), Arrow (type) catheter was placed into the radial artery, secured by Tegaderm,   Seldinger technique used.  Events:  patient tolerated procedure well with no complications.

## 2025-02-11 NOTE — ANESTHESIA PROCEDURE NOTES
Airway  Date/Time: 2/11/2025 12:18 PM  Urgency: elective    Airway not difficult    Staffing  Performed: CRNA   Authorized by: Yordan Amin MD    Performed by: BETO Shultz-RAMBO  Patient location during procedure: OR    Indications and Patient Condition  Indications for airway management: anesthesia and airway protection  Spontaneous ventilation: present  Sedation level: no sedation  Preoxygenated: yes  Patient position: sniffing  MILS maintained throughout  Mask difficulty assessment: 1 - vent by mask    Final Airway Details  Final airway type: endotracheal airway      Successful airway: ETT  Cuffed: yes   Successful intubation technique: video laryngoscopy  Facilitating devices/methods: intubating stylet  Endotracheal tube insertion site: oral  Blade: Marisol  Blade size: #4  ETT size (mm): 7.5  Cormack-Lehane Classification: grade I - full view of glottis  Placement verified by: chest auscultation and capnometry   Measured from: lips  Number of attempts at approach: 1    Additional Comments  Elective [place]

## 2025-02-12 VITALS
HEART RATE: 84 BPM | SYSTOLIC BLOOD PRESSURE: 139 MMHG | RESPIRATION RATE: 18 BRPM | WEIGHT: 254.19 LBS | DIASTOLIC BLOOD PRESSURE: 82 MMHG | HEIGHT: 76 IN | OXYGEN SATURATION: 96 % | BODY MASS INDEX: 30.95 KG/M2 | TEMPERATURE: 98.2 F

## 2025-02-12 PROCEDURE — 99239 HOSP IP/OBS DSCHRG MGMT >30: CPT | Performed by: PHYSICIAN ASSISTANT

## 2025-02-12 PROCEDURE — 7100000011 HC EXTENDED STAY RECOVERY HOURLY - NURSING UNIT

## 2025-02-12 PROCEDURE — 2500000001 HC RX 250 WO HCPCS SELF ADMINISTERED DRUGS (ALT 637 FOR MEDICARE OP): Performed by: STUDENT IN AN ORGANIZED HEALTH CARE EDUCATION/TRAINING PROGRAM

## 2025-02-12 RX ORDER — PANTOPRAZOLE SODIUM 40 MG/1
40 TABLET, DELAYED RELEASE ORAL
Qty: 30 TABLET | Refills: 0 | Status: SHIPPED | OUTPATIENT
Start: 2025-02-13 | End: 2025-03-15

## 2025-02-12 RX ADMIN — METOPROLOL SUCCINATE 25 MG: 25 TABLET, EXTENDED RELEASE ORAL at 06:10

## 2025-02-12 RX ADMIN — APIXABAN 5 MG: 5 TABLET, FILM COATED ORAL at 08:15

## 2025-02-12 RX ADMIN — PANTOPRAZOLE SODIUM 40 MG: 40 TABLET, DELAYED RELEASE ORAL at 06:10

## 2025-02-12 ASSESSMENT — COGNITIVE AND FUNCTIONAL STATUS - GENERAL
DAILY ACTIVITIY SCORE: 24
MOBILITY SCORE: 24

## 2025-02-12 ASSESSMENT — ACTIVITIES OF DAILY LIVING (ADL): LACK_OF_TRANSPORTATION: NO

## 2025-02-12 ASSESSMENT — PAIN - FUNCTIONAL ASSESSMENT: PAIN_FUNCTIONAL_ASSESSMENT: 0-10

## 2025-02-12 ASSESSMENT — PAIN SCALES - GENERAL: PAINLEVEL_OUTOF10: 0 - NO PAIN

## 2025-02-12 NOTE — PROGRESS NOTES
02/12/25 0845   Discharge Planning   Living Arrangements Spouse/significant other   Support Systems Spouse/significant other   Assistance Needed none   Type of Residence Private residence   Number of Stairs to Enter Residence 2   Number of Stairs Within Residence 10   Do you have animals or pets at home? Yes   Type of Animals or Pets 1 dog   Who is requesting discharge planning? Provider   Home or Post Acute Services None   Expected Discharge Disposition Home   Does the patient need discharge transport arranged? No   Financial Resource Strain   How hard is it for you to pay for the very basics like food, housing, medical care, and heating? Not very   Housing Stability   In the last 12 months, was there a time when you were not able to pay the mortgage or rent on time? N   In the past 12 months, how many times have you moved where you were living? 1   At any time in the past 12 months, were you homeless or living in a shelter (including now)? N   Transportation Needs   In the past 12 months, has lack of transportation kept you from medical appointments or from getting medications? no   In the past 12 months, has lack of transportation kept you from meetings, work, or from getting things needed for daily living? No   Patient Choice   Provider Choice list and CMS website (https://medicare.gov/care-compare#search) for post-acute Quality and Resource Measure Data were provided and reviewed with: Patient   Patient / Family choosing to utilize agency / facility established prior to hospitalization No   Stroke Family Assessment   Stroke Family Assessment Needed No     Transitional Care Coordination Progress Note:  Patient discussed during interdisciplinary rounds.   Team members present: RN BEENA HERNANDEZ MD   Plan per Medical/Surgical team: Medically ready   Payor: [facility] [employer]   Discharge disposition: None   Potential Barriers: None   ADOD: 2-     Previous Home Care: None   DME: None   Pharmacy: [facility]   Falls:  None   PCP:  SURY PETERSON   Dialysis: None

## 2025-02-12 NOTE — CARE PLAN
The patient's goals for the shift include  not being dizzy by end of shift.    The clinical goals for the shift include No oozing or bleeding from site throughout shift    Over the shift, the patient did make progress toward the following goals.         Problem: Arrythmia/Dysrhythmia  Goal: Promote self management  Outcome: Progressing  Goal: Vital signs return to baseline  Outcome: Progressing     Problem: Catheter site bleeding  Goal: No oozing or bleeding from site.  Outcome: Progressing

## 2025-02-12 NOTE — NURSING NOTE
Discharge instructions were gone over with patient at the bedside who verbalized understanding. Telemetry was removed. IV's were removed with tip in tact and no complications. Patient was taken down via transport with all belongings to main entrance.  Roz Leonard RN

## 2025-02-12 NOTE — CARE PLAN
Problem: Arrythmia/Dysrhythmia  Goal: Lab values return to normal range  Outcome: Met  Goal: No evidence of post procedure complications  Outcome: Met  Goal: Promote self management  Outcome: Met  Goal: Serial ECG will return to baseline  Outcome: Met  Goal: Verbalize understanding of procedures/devices  Outcome: Met  Goal: Vital signs return to baseline  Outcome: Met  Goal: Care and maintenance of device (specify)  Outcome: Met     Problem: Cardiac catheterization  Goal: Free from dysrhythmias  Outcome: Met  Goal: Free from pain  Outcome: Met  Goal: No evidence of post procedure complications  Outcome: Met  Goal: Promote self management  Outcome: Met  Goal: Verbalize understanding of procedure  Outcome: Met  Goal: Care and maintenance of device (specify)  Outcome: Met

## 2025-02-12 NOTE — DISCHARGE SUMMARY
Discharge Diagnosis  AFib, s/p ablation 2/11/25    Issues Requiring Follow-Up  F/u PCP 2 weeks  EP F/u 1 month as scheduled    Test Results Pending At Discharge:   none    Hospital Course  51yo M with HLD, VV syncope, (+) DAVE (undefined autoimmune dz), AT s/p ablation 2009 at [facility], and AFL, AF on Eliquis who presented from home for planned AF ablation. Due to pt's insurance, he stayed overnight.    Pt underwent AF ablation (PVI) without complication on 2/11/25(see procedure report). Pt stayed overnight on telemetry; tele was benign with NSR. On day of discharge, VSS, pt had voided and ambulated without issues. Pt has very mild intermittent pleuritic chest discomfort c/w mild post ablation pericarditis. RFV access sites remained stable. Post ablation EKG was reviewed and was without acute changes c/t prior tracing. Case was d/w EP attending and EP fellow Milli Peñaloza MD who saw pt 2/12 and who agreed pt was stable and appropriate for discharge home. Post procedure written instructions were reviewed with pt and all questions answered. Start pantoprazole for 1 month post ablation for esophageal prophylaxis. Note pt did not start flecainide thus he can remain off this. Pt was given an incentive spirometer to use 10x q1-2h for 3-5 days to encourage deep breaths (RN instructed on use). EP F/u 1 month with Nancy Rainey CNP as scheduled.    Pertinent Physical Exam At Time of Discharge  Gen-A+O x 3  Skin-W+D  Lungs-CTAB  CV-RRR, no m/g/r  Abd-soft, NT/ND, +BS  Extrem-no LE edema; RFV access site without bleeding, hematoma, or ecchymosis  Neuro-HEREDIA  Psych-appropriate mood and affect    Home Medications  Medication List      START taking these medications     pantoprazole 40 mg EC tablet; Commonly known as: ProtoNix; Take 1 tablet   (40 mg) by mouth once daily in the morning. Take before meals. For 1 month   post ablation; Start taking on: February 13, 2025     CONTINUE taking these medications    apixaban 5 mg tablet;  Commonly known as: Eliquis; Take 1 tablet (5 mg)   by mouth every 12 hours    metoprolol succinate XL 25 mg 24 hr tablet; Commonly known as: Toprol-XL    Outpatient Follow-Up  Future Appointments   Date Time Provider Department Center   3/10/2025  2:30 PM Nancy Rainey, BETO-CNP [id number] Academic     Greater than 30 min were spent on medication education, post ablation teaching, and coordination of discharge    BUDDY Caal, PA-C, [facility]  Inpatient Cardiac Electrophysiology

## 2025-02-15 LAB
ATRIAL RATE: 78 BPM
P AXIS: 66 DEGREES
P OFFSET: 210 MS
P ONSET: 143 MS
PR INTERVAL: 160 MS
Q ONSET: 223 MS
QRS COUNT: 12 BEATS
QRS DURATION: 102 MS
QT INTERVAL: 406 MS
QTC CALCULATION(BAZETT): 462 MS
QTC FREDERICIA: 443 MS
R AXIS: 12 DEGREES
T AXIS: 38 DEGREES
T OFFSET: 426 MS
VENTRICULAR RATE: 78 BPM

## 2025-03-10 ENCOUNTER — OFFICE VISIT (OUTPATIENT)
Dept: CARDIOLOGY | Facility: HOSPITAL | Age: 50
End: 2025-03-10
Payer: MEDICARE

## 2025-03-10 VITALS
HEART RATE: 87 BPM | BODY MASS INDEX: 31.08 KG/M2 | DIASTOLIC BLOOD PRESSURE: 84 MMHG | SYSTOLIC BLOOD PRESSURE: 144 MMHG | HEIGHT: 76 IN | OXYGEN SATURATION: 99 % | WEIGHT: 255.2 LBS

## 2025-03-10 DIAGNOSIS — I48.0 PAROXYSMAL ATRIAL FIBRILLATION (MULTI): Primary | ICD-10-CM

## 2025-03-10 PROCEDURE — 99214 OFFICE O/P EST MOD 30 MIN: CPT | Performed by: NURSE PRACTITIONER

## 2025-03-10 PROCEDURE — 1036F TOBACCO NON-USER: CPT | Performed by: NURSE PRACTITIONER

## 2025-03-10 PROCEDURE — 3008F BODY MASS INDEX DOCD: CPT | Performed by: NURSE PRACTITIONER

## 2025-03-10 PROCEDURE — 93005 ELECTROCARDIOGRAM TRACING: CPT | Performed by: NURSE PRACTITIONER

## 2025-03-10 ASSESSMENT — PAIN SCALES - GENERAL: PAINLEVEL_OUTOF10: 2

## 2025-03-11 LAB
ATRIAL RATE: 77 BPM
P AXIS: 58 DEGREES
P OFFSET: 206 MS
P ONSET: 134 MS
PR INTERVAL: 180 MS
Q ONSET: 224 MS
QRS COUNT: 13 BEATS
QRS DURATION: 98 MS
QT INTERVAL: 376 MS
QTC CALCULATION(BAZETT): 425 MS
QTC FREDERICIA: 408 MS
R AXIS: 18 DEGREES
T AXIS: 46 DEGREES
T OFFSET: 412 MS
VENTRICULAR RATE: 77 BPM

## 2025-03-11 ASSESSMENT — ENCOUNTER SYMPTOMS
DYSPNEA ON EXERTION: 0
BLURRED VISION: 0
DIARRHEA: 0
SPUTUM PRODUCTION: 0
SORE THROAT: 0
HEMOPTYSIS: 0
DIZZINESS: 0
IRREGULAR HEARTBEAT: 0
VOMITING: 0
SHORTNESS OF BREATH: 0
DIAPHORESIS: 0
NAUSEA: 0
SNORING: 0
FALLS: 0
ORTHOPNEA: 0
PND: 0
HEADACHES: 0
NEAR-SYNCOPE: 0
ABDOMINAL PAIN: 0
LIGHT-HEADEDNESS: 0
FEVER: 0
MYALGIAS: 1
COUGH: 0
SYNCOPE: 0
DOUBLE VISION: 0
PALPITATIONS: 1

## 2025-03-11 NOTE — PROGRESS NOTES
"Subjective   Parminder Knox is a 50 y.o. male.    Chief Complaint:  Atrial Fibrillation    Parminder Knox is a 50 y.o. year old male patient who presents today for 1 month follow up post Afib Ablation  Past Medical History: Afib, Palpitations, HLD, Near syncope, Vasovagal syncope, SVT, Dizziness,Headache, 12/2009 EPS SVT/VT Ablation, 03/2010 EPS SVT/VT Ablation (epicardial AT Ablation)  Paroxysmal Atrial fibrillation dx 2018, Chads Vasc 0    Left atrial tachycardia in 2009 s/p ablation of left posterior wall focal AT in 2009 at the [facility], also epicardial PAC/AT ablation attempt in 2010 aborted due to no PAC/Ats during study    Mr. Knox was last seen by Dr. Shay in 2019 and at that time no changes were made.   Starting August 2024 he had severe symptomatic episodes of PAC's and atrial tachycardia. He was seen by his EP group who orderd a Zio monitor.      Now s/p Afib Ablation with Dr. Santamaria 2/11/2025  ECG 3/10/2025 NSR HR 77 bpm, inc RBBB    TODAY Patient is now 1 month post redo ablation and in NSR. He's had a few flutters that have lasted seconds-minutes.  He had some chest discomfort on the left and right side that is slowly getting better.  He feels like his autoimmune disease is acting up and would like to restart his prednisone.  He has many aches and pains and some RLE vein bulging that is not new.    /84 (BP Location: Left arm, Patient Position: Sitting)   Pulse 87   Ht 1.93 m (6' 4\")   Wt 116 kg (255 lb 3.2 oz)   SpO2 99%   BMI 31.06 kg/m²   Current Outpatient Medications on File Prior to Visit   Medication Sig Dispense Refill    metoprolol succinate XL (Toprol-XL) 25 mg 24 hr tablet Take 1 tablet (25 mg) by mouth once daily.      [DISCONTINUED] apixaban (Eliquis) 5 mg tablet Take 1 tablet (5 mg) by mouth every 12 hours.      [DISCONTINUED] pantoprazole (ProtoNix) 40 mg EC tablet Take 1 tablet (40 mg) by mouth once daily in the morning. Take before " meals. For 1 month post ablation (Patient not taking: Reported on 3/10/2025) 30 tablet 0     No current facility-administered medications on file prior to visit.         Review of Systems   Constitutional: Positive for malaise/fatigue. Negative for diaphoresis and fever.   HENT:  Negative for congestion and sore throat.    Eyes:  Negative for blurred vision and double vision.   Cardiovascular:  Positive for chest pain and palpitations. Negative for dyspnea on exertion, irregular heartbeat, leg swelling, near-syncope, orthopnea, paroxysmal nocturnal dyspnea and syncope.   Respiratory:  Negative for cough, hemoptysis, shortness of breath, snoring and sputum production.    Hematologic/Lymphatic: Negative for bleeding problem.   Skin:  Negative for rash.   Musculoskeletal:  Positive for myalgias. Negative for falls and joint pain.   Gastrointestinal:  Negative for abdominal pain, diarrhea, nausea and vomiting.   Neurological:  Negative for dizziness, headaches and light-headedness.   All other systems reviewed and are negative.      Objective   Constitutional:       Appearance: Healthy appearance. Not in distress.   Eyes:      Conjunctiva/sclera: Conjunctivae normal.   HENT:      Nose: Nose normal.    Mouth/Throat:      Pharynx: Oropharynx is clear.   Neck:      Vascular: No JVR. JVD normal.   Pulmonary:      Effort: Pulmonary effort is normal.      Breath sounds: Normal breath sounds. No wheezing. No rhonchi.   Chest:      Chest wall: Not tender to palpatation.   Cardiovascular:      Normal rate. Regular rhythm.      Murmurs: There is no murmur.      No rub.   Pulses:     Intact distal pulses.   Edema:     Peripheral edema absent.   Abdominal:      General: Bowel sounds are normal.      Palpations: Abdomen is soft.   Musculoskeletal: Normal range of motion.      Cervical back: Neck supple. Skin:     General: Skin is warm and dry.      Comments: Right groin site well approximated, no  bruising/bleeding/swelling/redness/pain   Neurological:      Mental Status: Alert and oriented to person, place and time.      Motor: Motor function is intact.         Lab Review:   Admission on 02/11/2025, Discharged on 02/12/2025   Component Date Value    POCT Activated Clotting * 02/11/2025 208 (H)     POCT Activated Clotting * 02/11/2025 247 (H)     POCT Activated Clotting * 02/11/2025 285 (H)     POCT Activated Clotting * 02/11/2025 326 (H)     Ventricular Rate 02/11/2025 78     Atrial Rate 02/11/2025 78     SC Interval 02/11/2025 160     QRS Duration 02/11/2025 102     QT Interval 02/11/2025 406     QTC Calculation(Bazett) 02/11/2025 462     P Axis 02/11/2025 66     R [place] 02/11/2025 12     T Axis 02/11/2025 38     QRS Count 02/11/2025 12     Q Onset 02/11/2025 223     P Onset 02/11/2025 143     P Offset 02/11/2025 210     T Offset 02/11/2025 426     QTC [place] 02/11/2025 443    Orders Only on 02/06/2025   Component Date Value    GLUCOSE 02/06/2025 104 (H)     UREA NITROGEN (BUN) 02/06/2025 16     CREATININE 02/06/2025 0.98     EGFR 02/06/2025 93     BUN/CREATININE RATIO 02/06/2025 SEE NOTE:     SODIUM 02/06/2025 140     POTASSIUM 02/06/2025 3.8     CHLORIDE 02/06/2025 105     CARBON DIOXIDE 02/06/2025 27     CALCIUM 02/06/2025 9.5     WHITE BLOOD CELL COUNT 02/06/2025 7.1     RED BLOOD CELL COUNT 02/06/2025 5.21     HEMOGLOBIN 02/06/2025 15.8     HEMATOCRIT 02/06/2025 47.2     MCV 02/06/2025 90.6     MCH 02/06/2025 30.3     MCHC 02/06/2025 33.5     RDW 02/06/2025 12.8     PLATELET COUNT 02/06/2025 255     MPV 02/06/2025 10.6    Office Visit on 11/18/2024   Component Date Value    Ventricular Rate 11/19/2024 71     Atrial Rate 11/19/2024 71     SC Interval 11/19/2024 184     QRS Duration 11/19/2024 98     QT Interval 11/19/2024 390     QTC Calculation(Bazett) 11/19/2024 423     P Axis 11/19/2024 61     R [place] 11/19/2024 18     T [place] 11/19/2024 31     QRS Count 11/19/2024 12     Q Onset 11/19/2024 224      P Onset 11/19/2024 132     P Offset 11/19/2024 199     T Offset 11/19/2024 419     QTC [place] 11/19/2024 412        Assessment/Plan   The encounter diagnosis was Paroxysmal atrial fibrillation (Multi).  Patient is 1 month post ablation and appears to be maintaining NSR.    He is having some myalgias and general discomfort in the chest/body.  Some of this is likely related to inflammation from the procedure. Patient has colchicine and is wondering if he can take it.  He has previously taken it twice a day as needed for worsening body aches.  We discussed normal symptoms that can happen post ablation and when he should go to the ED or have an office visit.  Patient expresses understanding, all questions asked and answered.  He can take his colchicine 1-2 times a day until chest discomfort is relieved  He can restart Prednisone as directed by prescriber-reviewed with Dr. Santamaria  Follow up with me in 2-3 months or sooner as needed for recurrent arrhythmias.

## 2025-03-20 ENCOUNTER — OFFICE VISIT (OUTPATIENT)
Dept: ORTHOPEDIC SURGERY | Facility: CLINIC | Age: 50
End: 2025-03-20
Payer: MEDICARE

## 2025-03-20 DIAGNOSIS — M77.8 TENDINITIS OF BOTH ELBOWS: Primary | ICD-10-CM

## 2025-03-20 PROCEDURE — 99203 OFFICE O/P NEW LOW 30 MIN: CPT | Performed by: FAMILY MEDICINE

## 2025-03-20 NOTE — PROGRESS NOTES
History of Present Illness   Chief Complaint   Patient presents with    Left Elbow - Pain     NPV BL ELBOW JOINT PAIN X 1.5 YEARS NKI  PT NOTES HE FEELS THAT THEY ARE DISLOCATING ROUTINELY  PT WAS SEEN AT THE [facility] ABOUT 1.5 YEARS AGO  XRAYS DONE THEN APPEAR NEGATIVE  HX OF RECENT HEART ABLATION FOR AFIB     Right Elbow - Pain       The patient is 50 y.o. male  here with a complaint of bilateral elbow pain.  This is a chronic issue over the past 1.5 years, he denies any injury that he can recall.  Patient says that previously was quite active working out, says that closer to the time when his symptoms started he made some adjustments to his workouts, was doing more hammer curls as supposed traditional curls, was doing different tricep workouts as well.  Does not recall any injury with this but only thing can think of different prior to onset.  He had been following with the [facility] previously for majority of his medical care including his elbows, he did see orthopedic provider there, did have x-rays of bilateral elbows that showed some very mild degenerative changes, did have bilateral elbow ultrasounds in November 2024 that showed some mild triceps enthesiopathy but no other abnormalities.  He says that pain is somewhat generalized more prominent in the posterior aspect of his elbow, he says right elbow is more prominent than the left because he is right-handed and density more with his right elbow.  He says that he will get pain with day-to-day activity, says that he was playing tug-of-war with his dog yesterday and had pain, if he throws a ball he will have pain.  He is not able to golf because of pain in his elbows.  He denies any popping or clicking of the elbow.  He denies any focal pain at the medial or lateral epicondyles bilaterally.  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  Was told that he likely has some inflammation secondary to previous EBV infection.  Patient also has some cardiac issues, follows with cardiology, recently underwent ablation procedure    No past medical history on file.    Medication Documentation Review Audit       Reviewed by FELI Davis (Nurse Practitioner) on 25 at 1234      Medication Order Taking? Sig Documenting Provider Last Dose Status    Discontinued 03/10/25 1526   apixaban (Eliquis) 5 mg tablet [id number]  Take 1 tablet (5 mg) by mouth every 12 hours. FELI Davis  Active   metoprolol succinate XL (Toprol-XL) 25 mg 24 hr tablet [id number] Yes Take 1 tablet (25 mg) by mouth once daily. Historical Provider, MD   03/10/25 6985    Patient not taking:   Discontinued 03/10/25 1513                    Allergies   Allergen Reactions    Atorvastatin Myalgia     myalgia    House Dust Mite Runny nose     stuffy nose       Social History     Socioeconomic History    Marital status:      Spouse name: Not on file    Number of children: Not on file    Years of education: Not on file    Highest education level: Not on file   Occupational History    Not on file   Tobacco Use    Smoking status: Never    Smokeless tobacco: Never   Substance and Sexual Activity    Alcohol use: Not on file    Drug use: Not on file    Sexual activity: Not on file   Other Topics Concern    Not on file   Social History Narrative    Not on file     Social Drivers of Health     Financial Resource Strain: Low Risk  (2025)    Overall Financial Resource Strain (CARDIA)     Difficulty of Paying Living Expenses: Not very hard   Food Insecurity: No Food Insecurity (2025)    Hunger Vital Sign     Worried About Running Out of Food in the Last Year: Never true     Ran Out of Food in the Last Year: Never  true   Transportation Needs: No Transportation Needs (2/12/2025)    PRAPARE - Transportation     Lack of Transportation (Medical): No     Lack of Transportation (Non-Medical): No   Physical Activity: Not on file   Stress: Not on file   Social Connections: Not on file   Intimate Partner Violence: Not At Risk (2/11/2025)    Humiliation, Afraid, Rape, and Kick questionnaire     Fear of Current or Ex-Partner: No     Emotionally Abused: No     Physically Abused: No     Sexually Abused: No   Housing Stability: Low Risk  (2/12/2025)    Housing Stability Vital Sign     Unable to Pay for Housing in the Last Year: No     Number of Times Moved in the Last Year: 1     Homeless in the Last Year: No       Past Surgical History:   Procedure Laterality Date    CARDIAC ELECTROPHYSIOLOGY PROCEDURE Right 2/11/2025    Procedure: Ablation A-Fib Paroxysmal;  Surgeon: Tereso Santamaria MD;  Location: [place] [address] Cardiac Cath Lab;  Service: Electrophysiology;  Laterality: Right;  CARTO/ANY EP LAB  GENERAL ANESTHESIA  PATIENT HAS [facility], PATIENT WILL STAY OVERNIGHT DUE TO INSURANCE    OTHER SURGICAL HISTORY  05/11/2020    Knee arthroscopy          Review of Systems   GENERAL: Negative  GI: Negative  MUSCULOSKELETAL: See HPI  SKIN: Negative  NEURO:  Negative     Physical Exam:    General/Constitutional: well appearing, no distress, appears stated age  HEENT: sclera clear  Respiratory: non labored breathing  Vascular: No edema, swelling or tenderness, except as noted in detailed exam.  Integumentary: No impressive skin lesions present, except as noted in detailed exam.  Neurological:  Alert and oriented   Psychological:  Normal mood and affect.  Musculoskeletal: Normal, except as noted in detailed exam and in HPI    Bilateral elbows are normal in appearance, no skin changes, no muscle atrophy.  At both elbows there is some focal tenderness at the distal triceps near the insertion on the olecranon, there is no tenderness at the medial or  lateral epicondyles at the common flexor and extensor tendon origins.  No other areas of tenderness to palpation.  He has slight decreased range of motion with extension, lacks around 5 degrees on the right, just a few degrees in the left, he has full flexion, there is some pain at endrange of extension.  No motor deficits are present at the elbow, mild pain with resisted elbow extension bilaterally.  There is no exacerbation of elbow pain with resisted wrist flexion or extension or pronation or supination bilaterally.       Imaging: X-ray report and ultrasound report of bilateral elbows from the [facility] are available for review but no imaging, there is evidence of mild degenerative changes send bilateral elbows with some mild osteophytosis, ultrasound of bilateral elbows negative for any triceps tendinopathy.      Assessment   1. Tendinitis of both elbows  Referral to Occupational Therapy            Plan: Bilateral elbow pain, chronic over the past year and a half, on exam today majority of symptoms appear to be at the triceps insertion, not consistent with medial or lateral epicondylitis based on history, exam findings, previous x-ray showed some mild degenerative changes, previous ultrasounds were unremarkable.  Discussed further workup and treatment.  I do think patient would benefit from trial of some formal occupational therapy, may benefit from some soft tissue modalities, explained the rationale of therapy for his chronic elbow pain.  No indication for any injections today, if symptoms persist despite occupational therapy over the next several months could consider bilateral elbow MRI for further evaluation of any other structural abnormality not seen on ultrasound.  Patient in agreement with plan.  All questions and concerns were answered.

## 2025-03-25 ENCOUNTER — HOSPITAL ENCOUNTER (OUTPATIENT)
Dept: RADIOLOGY | Facility: EXTERNAL LOCATION | Age: 50
Discharge: HOME | End: 2025-03-25

## 2025-03-31 ENCOUNTER — OFFICE VISIT (OUTPATIENT)
Dept: CARDIOLOGY | Facility: CLINIC | Age: 50
End: 2025-03-31
Payer: MEDICARE

## 2025-03-31 VITALS
HEIGHT: 76 IN | HEART RATE: 80 BPM | OXYGEN SATURATION: 95 % | BODY MASS INDEX: 31.05 KG/M2 | DIASTOLIC BLOOD PRESSURE: 87 MMHG | SYSTOLIC BLOOD PRESSURE: 129 MMHG | WEIGHT: 255 LBS

## 2025-03-31 DIAGNOSIS — I48.0 PAROXYSMAL ATRIAL FIBRILLATION (MULTI): Primary | ICD-10-CM

## 2025-03-31 LAB
ATRIAL RATE: 80 BPM
P AXIS: 31 DEGREES
P OFFSET: 206 MS
P ONSET: 145 MS
PR INTERVAL: 160 MS
Q ONSET: 225 MS
QRS COUNT: 14 BEATS
QRS DURATION: 92 MS
QT INTERVAL: 372 MS
QTC CALCULATION(BAZETT): 429 MS
QTC FREDERICIA: 409 MS
R AXIS: 3 DEGREES
T AXIS: 1 DEGREES
T OFFSET: 411 MS
VENTRICULAR RATE: 80 BPM

## 2025-03-31 PROCEDURE — 1036F TOBACCO NON-USER: CPT | Performed by: NURSE PRACTITIONER

## 2025-03-31 PROCEDURE — 99215 OFFICE O/P EST HI 40 MIN: CPT | Performed by: NURSE PRACTITIONER

## 2025-03-31 PROCEDURE — 3008F BODY MASS INDEX DOCD: CPT | Performed by: NURSE PRACTITIONER

## 2025-03-31 PROCEDURE — 93005 ELECTROCARDIOGRAM TRACING: CPT | Performed by: NURSE PRACTITIONER

## 2025-04-06 ASSESSMENT — ENCOUNTER SYMPTOMS
PND: 0
FEVER: 0
MYALGIAS: 0
ORTHOPNEA: 0
NEAR-SYNCOPE: 0
DOUBLE VISION: 0
DIAPHORESIS: 0
FALLS: 0
BLURRED VISION: 0
DIZZINESS: 0
DYSPNEA ON EXERTION: 0
HEMOPTYSIS: 0
ABDOMINAL PAIN: 0
IRREGULAR HEARTBEAT: 0
DIARRHEA: 0
PALPITATIONS: 1
NAUSEA: 0
SYNCOPE: 0
VOMITING: 0
SNORING: 0
SPUTUM PRODUCTION: 0
HEADACHES: 0
SHORTNESS OF BREATH: 0
COUGH: 0
LIGHT-HEADEDNESS: 0
SORE THROAT: 0

## 2025-04-07 NOTE — PROGRESS NOTES
"Subjective   Parminder Knox is a 50 y.o. male.    Chief Complaint:  Atrial Fibrillation    Parminder Knox is a 50 y.o. year old male patient who presents today for 1.5 month follow up post Afib Ablation  Past Medical History: Afib, Palpitations, HLD, Near syncope, Vasovagal syncope, SVT, Dizziness,Headache, 12/2009 EPS SVT/VT Ablation, 03/2010 EPS SVT/VT Ablation (epicardial AT Ablation)  Paroxysmal Atrial fibrillation dx 2018, Chads Vasc 0    Left atrial tachycardia in 2009 s/p ablation of left posterior wall focal AT in 2009 at the [facility], also epicardial PAC/AT ablation attempt in 2010 aborted due to no PAC/Ats during study    Mr. Knox was last seen by Dr. Shay in 2019 and at that time no changes were made.   Starting August 2024 he had severe symptomatic episodes of PAC's and atrial tachycardia. He was seen by his EP group who orderd a Zio monitor.      Now s/p Afib Ablation with Dr. Santamaria 2/11/2025  ECG 3/10/2025 NSR HR 77 bpm, inc RBBB  ECG 3/31/3035 NSR with PAC HR 80 bpm    Patient presents today for follow up of his Afib.  He thought he went into Afib about 3 days ago.  He felt a lot of irregular beats and was very tired.  He has been feeling better over the last 24-48 hours.  Patient has a lot of questions about what it is normal to be feeling right now post ablation.    /87 (BP Location: Right arm, Patient Position: Sitting)   Pulse 80   Ht 1.93 m (6' 4\")   Wt 116 kg (255 lb)   SpO2 95%   BMI 31.04 kg/m²   Current Outpatient Medications on File Prior to Visit   Medication Sig Dispense Refill    apixaban (Eliquis) 5 mg tablet Take 1 tablet (5 mg) by mouth every 12 hours. 60 tablet 2    metoprolol succinate XL (Toprol-XL) 25 mg 24 hr tablet Take 1 tablet (25 mg) by mouth once daily.       No current facility-administered medications on file prior to visit.         Review of Systems   Constitutional: Positive for malaise/fatigue. Negative for diaphoresis " and fever.   HENT:  Negative for congestion and sore throat.    Eyes:  Negative for blurred vision and double vision.   Cardiovascular:  Positive for palpitations. Negative for chest pain, dyspnea on exertion, irregular heartbeat, leg swelling, near-syncope, orthopnea, paroxysmal nocturnal dyspnea and syncope.   Respiratory:  Negative for cough, hemoptysis, shortness of breath, snoring and sputum production.    Hematologic/Lymphatic: Negative for bleeding problem.   Skin:  Negative for rash.   Musculoskeletal:  Negative for falls, joint pain and myalgias.   Gastrointestinal:  Negative for abdominal pain, diarrhea, nausea and vomiting.   Neurological:  Negative for dizziness, headaches and light-headedness.   All other systems reviewed and are negative.      Objective   Constitutional:       Appearance: Healthy appearance. Not in distress.   Eyes:      Conjunctiva/sclera: Conjunctivae normal.   HENT:      Nose: Nose normal.    Mouth/Throat:      Pharynx: Oropharynx is clear.   Neck:      Vascular: No JVR. JVD normal.   Pulmonary:      Effort: Pulmonary effort is normal.      Breath sounds: Normal breath sounds. No wheezing. No rhonchi.   Chest:      Chest wall: Not tender to palpatation.   Cardiovascular:      Normal rate. Regular rhythm.      Murmurs: There is no murmur.      No rub.   Pulses:     Intact distal pulses.   Edema:     Peripheral edema absent.   Abdominal:      General: Bowel sounds are normal.      Palpations: Abdomen is soft.   Musculoskeletal: Normal range of motion.      Cervical back: Neck supple. Skin:     General: Skin is warm and dry.   Neurological:      Mental Status: Alert and oriented to person, place and time.      Motor: Motor function is intact.         Lab Review:   Office Visit on 03/10/2025   Component Date Value    Ventricular Rate 03/10/2025 77     Atrial Rate 03/10/2025 77     RI Interval 03/10/2025 180     QRS Duration 03/10/2025 98     QT Interval 03/10/2025 376     QTC  Calculation(Bazett) 03/10/2025 425     P [place] 03/10/2025 58     R [place] 03/10/2025 18     T Axis 03/10/2025 46     QRS Count 03/10/2025 13     Q Onset 03/10/2025 224     P Onset 03/10/2025 134     P Offset 03/10/2025 206     T Offset 03/10/2025 412     QTC [place] 03/10/2025 408    Admission on 02/11/2025, Discharged on 02/12/2025   Component Date Value    POCT Activated Clotting * 02/11/2025 208 (H)     POCT Activated Clotting * 02/11/2025 247 (H)     POCT Activated Clotting * 02/11/2025 285 (H)     POCT Activated Clotting * 02/11/2025 326 (H)     Ventricular Rate 02/11/2025 78     Atrial Rate 02/11/2025 78     [place] Interval 02/11/2025 160     QRS Duration 02/11/2025 102     QT Interval 02/11/2025 406     QTC Calculation(Bazett) 02/11/2025 462     P Axis 02/11/2025 66     R [place] 02/11/2025 12     T Axis 02/11/2025 38     QRS Count 02/11/2025 12     Q Onset 02/11/2025 223     P Onset 02/11/2025 143     P Offset 02/11/2025 210     T Offset 02/11/2025 426     QTC [place] 02/11/2025 443    Orders Only on 02/06/2025   Component Date Value    GLUCOSE 02/06/2025 104 (H)     UREA NITROGEN (BUN) 02/06/2025 16     CREATININE 02/06/2025 0.98     EGFR 02/06/2025 93     BUN/CREATININE RATIO 02/06/2025 SEE NOTE:     SODIUM 02/06/2025 140     POTASSIUM 02/06/2025 3.8     CHLORIDE 02/06/2025 105     CARBON DIOXIDE 02/06/2025 27     CALCIUM 02/06/2025 9.5     WHITE BLOOD CELL COUNT 02/06/2025 7.1     RED BLOOD CELL COUNT 02/06/2025 5.21     HEMOGLOBIN 02/06/2025 15.8     HEMATOCRIT 02/06/2025 47.2     MCV 02/06/2025 90.6     MCH 02/06/2025 30.3     MCHC 02/06/2025 33.5     RDW 02/06/2025 12.8     PLATELET COUNT 02/06/2025 255     MPV 02/06/2025 10.6    Office Visit on 11/18/2024   Component Date Value    Ventricular Rate 11/19/2024 71     Atrial Rate 11/19/2024 71     [place] Interval 11/19/2024 184     QRS Duration 11/19/2024 98     QT Interval 11/19/2024 390     QTC Calculation(Bazett) 11/19/2024 423     P Axis 11/19/2024 61     R  [place] 11/19/2024 18     T [place] 11/19/2024 31     QRS Count 11/19/2024 12     Q Onset 11/19/2024 224     P Onset 11/19/2024 132     P Offset 11/19/2024 199     T Offset 11/19/2024 419     QTC [place] 11/19/2024 412        Assessment/Plan   The encounter diagnosis was Paroxysmal atrial fibrillation (Multi).  Patient is 1.5 months post ablation and appears to be maintaining NSR.    He had some irregular heart beats and fatigue at the end of last week and thought he might have been in Afib.  He is in NSR with some premature beats.  We will continue metoprolol and Eliquis.  He is wondering ir he can get a vascular referral for his bulging veins in his legs.  We discussed his symptoms at length, all questions asked and answered  Follow up with me in may as previously scheduled or sooner as needed

## 2025-04-20 NOTE — PROGRESS NOTES
Subjective   Patient ID: Parminder Knox is a 50 y.o. male    Chief Complaint: No chief complaint on file.       Last Surgery: Ablation A-Fib Paroxysmal - Right  Date of Last Surgery: 2/11/2025    HPI  Parminder Knox is a 50 y.o. *** hand dominant male presenting for bilateral elbow pain      Objective   Patient is a well-developed, well-nourished male in no acute distress.  Breathes with normal chest rises.  Pupils are round and symmetric today.  Awake, alert, and oriented x3.     Examination of the left elbow today reveals the skin to be intact. No evidence of ecchymosis, bruising, lesions, or scarring. The epitrochlear lymph node exam at the time of visit was negative bilaterally. 5 out of 5 wrist flexion, wrist extension, and thumb extension bilaterally. Sensation is intact to light touch to median, ulnar, and radial nerves bilaterally. Stable to varus and valgus stress bilaterally.  Range of motion of the left elbow revealed 0-150° of flexion and extension and 85 degrees of supination and pronation. Patient had no tenderness to palpation at their medial or lateral epicondyle.      Examination of the right elbow today reveals the skin to be intact. No evidence of ecchymosis, bruising, lesions, or scarring. The epitrochlear lymph node exam at the time of visit was negative bilaterally. 5 out of 5 wrist flexion, wrist extension, and thumb extension bilaterally. Sensation is intact to light touch to median, ulnar, and radial nerves bilaterally. Stable to varus and valgus stress bilaterally.  Range of motion of the right elbow revealed 0-150° of flexion and extension and 85 degrees of supination and pronation. Patient had no tenderness to palpation at their medial or lateral epicondyle.      Imaging:    Assessment/Plan   Pain of both elbows  Patient with ***    Orders Placed This Encounter    XR elbow 3+ views bilateral         Follow up    Scribe Attestation  By signing my name below, I, Madiha Canada ,  Scribe   attest that this documentation has been prepared under the direction and in the presence of Yasmany Fernandez MD.

## 2025-04-21 ENCOUNTER — APPOINTMENT (OUTPATIENT)
Dept: ORTHOPEDIC SURGERY | Facility: CLINIC | Age: 50
End: 2025-04-21
Payer: MEDICARE

## 2025-04-21 ENCOUNTER — HOSPITAL ENCOUNTER (OUTPATIENT)
Dept: RADIOLOGY | Facility: CLINIC | Age: 50
Discharge: HOME | End: 2025-04-21
Payer: MEDICARE

## 2025-04-21 DIAGNOSIS — S46.301A: Primary | ICD-10-CM

## 2025-04-21 DIAGNOSIS — M25.521 PAIN OF BOTH ELBOWS: ICD-10-CM

## 2025-04-21 DIAGNOSIS — M25.522 PAIN OF BOTH ELBOWS: ICD-10-CM

## 2025-04-21 PROCEDURE — 73080 X-RAY EXAM OF ELBOW: CPT | Mod: 50

## 2025-04-21 PROCEDURE — 73080 X-RAY EXAM OF ELBOW: CPT | Mod: BILATERAL PROCEDURE | Performed by: STUDENT IN AN ORGANIZED HEALTH CARE EDUCATION/TRAINING PROGRAM

## 2025-04-21 PROCEDURE — 99214 OFFICE O/P EST MOD 30 MIN: CPT | Performed by: ORTHOPAEDIC SURGERY

## 2025-05-12 ENCOUNTER — HOSPITAL ENCOUNTER (OUTPATIENT)
Dept: RADIOLOGY | Facility: CLINIC | Age: 50
Discharge: HOME | End: 2025-05-12
Payer: MEDICARE

## 2025-05-12 DIAGNOSIS — S46.301A: ICD-10-CM

## 2025-05-12 PROCEDURE — 73221 MRI JOINT UPR EXTREM W/O DYE: CPT | Mod: RT

## 2025-05-15 ENCOUNTER — DOCUMENTATION (OUTPATIENT)
Dept: ORTHOPEDIC SURGERY | Facility: HOSPITAL | Age: 50
End: 2025-05-15
Payer: MEDICARE

## 2025-05-15 NOTE — PROGRESS NOTES
Discussed the results on the phone.  No evidence of a triceps injury.  Plan will be for physical therapy for 6 to 8 weeks if no improvement consideration of the elbow injection at that time

## 2025-05-16 RX ORDER — CIPROFLOXACIN HYDROCHLORIDE 3 MG/ML
SOLUTION/ DROPS OPHTHALMIC
COMMUNITY
Start: 2025-02-04

## 2025-05-19 ENCOUNTER — APPOINTMENT (OUTPATIENT)
Dept: CARDIOLOGY | Facility: CLINIC | Age: 50
End: 2025-05-19
Payer: MEDICARE

## 2025-05-19 VITALS
HEART RATE: 79 BPM | OXYGEN SATURATION: 96 % | WEIGHT: 255.6 LBS | HEIGHT: 76 IN | BODY MASS INDEX: 31.13 KG/M2 | SYSTOLIC BLOOD PRESSURE: 123 MMHG | DIASTOLIC BLOOD PRESSURE: 88 MMHG

## 2025-05-19 DIAGNOSIS — R00.2 PALPITATIONS: ICD-10-CM

## 2025-05-19 DIAGNOSIS — I48.0 PAROXYSMAL ATRIAL FIBRILLATION (MULTI): Primary | ICD-10-CM

## 2025-05-19 LAB
ATRIAL RATE: 79 BPM
P AXIS: 61 DEGREES
P OFFSET: 195 MS
P ONSET: 130 MS
PR INTERVAL: 180 MS
Q ONSET: 220 MS
QRS COUNT: 13 BEATS
QRS DURATION: 98 MS
QT INTERVAL: 376 MS
QTC CALCULATION(BAZETT): 431 MS
QTC FREDERICIA: 412 MS
R AXIS: 15 DEGREES
T AXIS: 40 DEGREES
T OFFSET: 408 MS
VENTRICULAR RATE: 79 BPM

## 2025-05-19 PROCEDURE — 93005 ELECTROCARDIOGRAM TRACING: CPT | Performed by: NURSE PRACTITIONER

## 2025-05-19 PROCEDURE — 99214 OFFICE O/P EST MOD 30 MIN: CPT | Performed by: NURSE PRACTITIONER

## 2025-05-19 PROCEDURE — 3008F BODY MASS INDEX DOCD: CPT | Performed by: NURSE PRACTITIONER

## 2025-05-19 PROCEDURE — 99212 OFFICE O/P EST SF 10 MIN: CPT | Performed by: NURSE PRACTITIONER

## 2025-05-19 RX ORDER — PREDNISONE 5 MG/1
5 TABLET ORAL DAILY
COMMUNITY
Start: 2024-10-18

## 2025-05-19 RX ORDER — METOPROLOL SUCCINATE 25 MG/1
25 TABLET, EXTENDED RELEASE ORAL
Qty: 90 TABLET | Refills: 3 | Status: SHIPPED | OUTPATIENT
Start: 2025-05-19 | End: 2026-05-14

## 2025-05-19 RX ORDER — COLCHICINE 0.6 MG/1
0.6 TABLET ORAL 2 TIMES DAILY PRN
Qty: 60 TABLET | Refills: 11 | Status: SHIPPED | OUTPATIENT
Start: 2025-05-19 | End: 2026-05-19

## 2025-05-19 ASSESSMENT — ENCOUNTER SYMPTOMS
DIARRHEA: 0
HEADACHES: 0
SPUTUM PRODUCTION: 0
SNORING: 0
ABDOMINAL PAIN: 0
DIZZINESS: 0
IRREGULAR HEARTBEAT: 1
COUGH: 0
SYNCOPE: 0
OCCASIONAL FEELINGS OF UNSTEADINESS: 0
NAUSEA: 0
ORTHOPNEA: 0
LIGHT-HEADEDNESS: 0
VOMITING: 0
LOSS OF SENSATION IN FEET: 0
FALLS: 0
SORE THROAT: 0
DOUBLE VISION: 0
NEAR-SYNCOPE: 0
DYSPNEA ON EXERTION: 0
DEPRESSION: 0
SHORTNESS OF BREATH: 0
HEMOPTYSIS: 0
BLURRED VISION: 0
DIAPHORESIS: 0
MYALGIAS: 0
FEVER: 0
PND: 0
PALPITATIONS: 1

## 2025-05-19 ASSESSMENT — COLUMBIA-SUICIDE SEVERITY RATING SCALE - C-SSRS
2. HAVE YOU ACTUALLY HAD ANY THOUGHTS OF KILLING YOURSELF?: NO
6. HAVE YOU EVER DONE ANYTHING, STARTED TO DO ANYTHING, OR PREPARED TO DO ANYTHING TO END YOUR LIFE?: NO
1. IN THE PAST MONTH, HAVE YOU WISHED YOU WERE DEAD OR WISHED YOU COULD GO TO SLEEP AND NOT WAKE UP?: NO

## 2025-05-19 ASSESSMENT — PAIN SCALES - GENERAL: PAINLEVEL_OUTOF10: 0-NO PAIN

## 2025-05-19 NOTE — PATIENT INSTRUCTIONS
Thank you for coming to see us today!  We will arrange for repeat afib ablation with the pulse field method.    Please try to avoid prednisone 6 weeks prior and 2 months after the procedure to allow for proper healing.

## 2025-05-19 NOTE — PROGRESS NOTES
"Subjective   Parminder Knox is a 50 y.o. male.    Chief Complaint:  Atrial Fibrillation    Parminder Knox is a 50 y.o. year old male patient who presents today for 3 month follow up post Afib Ablation  Past Medical History: Afib, Palpitations, HLD, Near syncope, Vasovagal syncope, SVT, Dizziness,Headache, 12/2009 EPS SVT/VT Ablation, 03/2010 EPS SVT/VT Ablation (epicardial AT Ablation)  Paroxysmal Atrial fibrillation dx 2018, Chads Vasc 0    Left atrial tachycardia in 2009 s/p ablation of left posterior wall focal AT in 2009 at the [facility], also epicardial PAC/AT ablation attempt in 2010 aborted due to no PAC/Ats during study    Mr. Knox was last seen by Dr. Shay in 2019 and at that time no changes were made.   Starting August 2024 he had severe symptomatic episodes of PAC's and atrial tachycardia. He was seen by his EP group who orderd a Zio monitor.      Now s/p Afib Ablation with Dr. Santamaria 2/11/2025  ECG 3/10/2025 NSR HR 77 bpm, inc RBBB  ECG 3/31/3035 NSR with PAC HR 80 bpm  Home ECG 5/2/2025 Afib  bpm  ECG 5/19/2025 NSR HR 79 bpm, inc RBBB    TODAY Patient presents for 3 month follow up of Afib post ablation. He's unfortunately had some recurrence of his arrhythmia and has felt poorly through out the month of April.  He's been feeling better over the last week after he's been on some Prednisone for a few days.  He has some home ECG's from earlier this month that show Afib    /88 (BP Location: Left arm, Patient Position: Sitting)   Pulse 79   Ht 1.93 m (6' 4\")   Wt 116 kg (255 lb 9.6 oz)   SpO2 96%   BMI 31.11 kg/m²   Current Outpatient Medications on File Prior to Visit   Medication Sig Dispense Refill    ciprofloxacin (Ciloxan) 0.3 % ophthalmic solution INSTILL 1 DROP INTO RIGHT EYE 4 TIMES A DAY AS DIRECTED      predniSONE (Deltasone) 5 mg tablet Take 1 tablet (5 mg) by mouth once daily.      [DISCONTINUED] apixaban (Eliquis) 5 mg tablet Take 1 " tablet (5 mg) by mouth every 12 hours. 60 tablet 2    [DISCONTINUED] metoprolol succinate XL (Toprol-XL) 25 mg 24 hr tablet Take 1 tablet (25 mg) by mouth once daily.       No current facility-administered medications on file prior to visit.         Review of Systems   Constitutional: Positive for malaise/fatigue. Negative for diaphoresis and fever.   HENT:  Negative for congestion and sore throat.    Eyes:  Negative for blurred vision and double vision.   Cardiovascular:  Positive for irregular heartbeat and palpitations. Negative for chest pain, dyspnea on exertion, leg swelling, near-syncope, orthopnea, paroxysmal nocturnal dyspnea and syncope.   Respiratory:  Negative for cough, hemoptysis, shortness of breath, snoring and sputum production.    Hematologic/Lymphatic: Negative for bleeding problem.   Skin:  Negative for rash.   Musculoskeletal:  Negative for falls, joint pain and myalgias.   Gastrointestinal:  Negative for abdominal pain, diarrhea, nausea and vomiting.   Neurological:  Negative for dizziness, headaches and light-headedness.   All other systems reviewed and are negative.      Objective   Constitutional:       Appearance: Healthy appearance. Not in distress.   Eyes:      Conjunctiva/sclera: Conjunctivae normal.   HENT:      Nose: Nose normal.    Mouth/Throat:      Pharynx: Oropharynx is clear.   Neck:      Vascular: No JVR. JVD normal.   Pulmonary:      Effort: Pulmonary effort is normal.      Breath sounds: Normal breath sounds. No wheezing. No rhonchi.   Chest:      Chest wall: Not tender to palpatation.   Cardiovascular:      Normal rate. Regular rhythm.      Murmurs: There is no murmur.      No rub.   Pulses:     Intact distal pulses.   Edema:     Peripheral edema absent.   Abdominal:      General: Bowel sounds are normal.      Palpations: Abdomen is soft.   Musculoskeletal: Normal range of motion.      Cervical back: Neck supple. Skin:     General: Skin is warm and dry.   Neurological:       Mental Status: Alert and oriented to person, place and time.      Motor: Motor function is intact.       Lab Review:   Office Visit on 03/31/2025   Component Date Value    Ventricular Rate 03/31/2025 80     Atrial Rate 03/31/2025 80     CT Interval 03/31/2025 160     QRS Duration 03/31/2025 92     QT Interval 03/31/2025 372     QTC Calculation(Bazett) 03/31/2025 429     P Axis 03/31/2025 31     R Axis 03/31/2025 3     T Woodward 03/31/2025 1     QRS Count 03/31/2025 14     Q Onset 03/31/2025 225     P Onset 03/31/2025 145     P Offset 03/31/2025 206     T Offset 03/31/2025 411     QTC Fredericia 03/31/2025 409    Office Visit on 03/10/2025   Component Date Value    Ventricular Rate 03/10/2025 77     Atrial Rate 03/10/2025 77     CT Interval 03/10/2025 180     QRS Duration 03/10/2025 98     QT Interval 03/10/2025 376     QTC Calculation(Bazett) 03/10/2025 425     P Woodward 03/10/2025 58     R Woodward 03/10/2025 18     T Axis 03/10/2025 46     QRS Count 03/10/2025 13     Q Onset 03/10/2025 224     P Onset 03/10/2025 134     P Offset 03/10/2025 206     T Offset 03/10/2025 412     QTC Fredericia 03/10/2025 408    Admission on 02/11/2025, Discharged on 02/12/2025   Component Date Value    POCT Activated Clotting * 02/11/2025 208 (H)     POCT Activated Clotting * 02/11/2025 247 (H)     POCT Activated Clotting * 02/11/2025 285 (H)     POCT Activated Clotting * 02/11/2025 326 (H)     Ventricular Rate 02/11/2025 78     Atrial Rate 02/11/2025 78     CT Interval 02/11/2025 160     QRS Duration 02/11/2025 102     QT Interval 02/11/2025 406     QTC Calculation(Bazett) 02/11/2025 462     P Axis 02/11/2025 66     R Woodward 02/11/2025 12     T Axis 02/11/2025 38     QRS Count 02/11/2025 12     Q Onset 02/11/2025 223     P Onset 02/11/2025 143     P Offset 02/11/2025 210     T Offset 02/11/2025 426     QTC Fredericia 02/11/2025 443    Orders Only on 02/06/2025   Component Date Value    GLUCOSE 02/06/2025 104 (H)     UREA NITROGEN (BUN)  02/06/2025 16     CREATININE 02/06/2025 0.98     EGFR 02/06/2025 93     BUN/CREATININE RATIO 02/06/2025 SEE NOTE:     SODIUM 02/06/2025 140     POTASSIUM 02/06/2025 3.8     CHLORIDE 02/06/2025 105     CARBON DIOXIDE 02/06/2025 27     CALCIUM 02/06/2025 9.5     WHITE BLOOD CELL COUNT 02/06/2025 7.1     RED BLOOD CELL COUNT 02/06/2025 5.21     HEMOGLOBIN 02/06/2025 15.8     HEMATOCRIT 02/06/2025 47.2     MCV 02/06/2025 90.6     MCH 02/06/2025 30.3     MCHC 02/06/2025 33.5     RDW 02/06/2025 12.8     PLATELET COUNT 02/06/2025 255     MPV 02/06/2025 10.6        Assessment/Plan   The primary encounter diagnosis was Paroxysmal atrial fibrillation (Multi). A diagnosis of Palpitations was also pertinent to this visit.  Patient is 3 months post ablation. He's unfortunately having episodes of Afib still.    We discussed options such as AAD vs repeat ablation. Dr. Syed in to see and discuss with patient as well.  He is very sensitive to medications and would prefer a more definitive fix to his arrhythmia.      We will proceed with repeat ablation for Afib   PLAN FOR PFA, ANY LAB-  PER DR. SYED    Patient will have to be off prednisone 6 weeks before and 2 months post ablation. After his last ablation, he restarted his prednisone a little over a month post procedure and that may have effected the way his ablation healed.  Patient can use colchicine BID for pericarditis symptoms in the meantime.  No date selected at this time, he's is going to look at his schedule in August and call us back with his availability.  Preop CBC/BMP ordered, will have to wait to order type and screen until there is a procedure date.  Blood work to be done within 21 days prior to procedure date.

## 2025-07-28 DIAGNOSIS — I48.0 PAROXYSMAL ATRIAL FIBRILLATION (MULTI): Primary | ICD-10-CM

## 2025-07-30 PROBLEM — E66.811 OBESITY, CLASS I, BMI 30-34.9: Status: RESOLVED | Noted: 2018-04-17 | Resolved: 2025-07-30

## 2025-08-04 ENCOUNTER — OFFICE VISIT (OUTPATIENT)
Dept: CARDIOLOGY | Facility: CLINIC | Age: 50
End: 2025-08-04
Payer: MEDICARE

## 2025-08-04 VITALS
DIASTOLIC BLOOD PRESSURE: 84 MMHG | OXYGEN SATURATION: 95 % | BODY MASS INDEX: 30.08 KG/M2 | HEIGHT: 76 IN | WEIGHT: 247 LBS | HEART RATE: 82 BPM | SYSTOLIC BLOOD PRESSURE: 123 MMHG

## 2025-08-04 DIAGNOSIS — I83.811 VARICOSE VEINS OF RIGHT LOWER EXTREMITY WITH PAIN: Primary | ICD-10-CM

## 2025-08-04 PROCEDURE — 1036F TOBACCO NON-USER: CPT | Performed by: INTERNAL MEDICINE

## 2025-08-04 PROCEDURE — 99212 OFFICE O/P EST SF 10 MIN: CPT | Performed by: INTERNAL MEDICINE

## 2025-08-04 PROCEDURE — 3008F BODY MASS INDEX DOCD: CPT | Performed by: INTERNAL MEDICINE

## 2025-08-04 PROCEDURE — 99203 OFFICE O/P NEW LOW 30 MIN: CPT | Performed by: INTERNAL MEDICINE

## 2025-08-04 ASSESSMENT — PAIN SCALES - GENERAL: PAINLEVEL_OUTOF10: 0-NO PAIN

## 2025-08-04 NOTE — PROGRESS NOTES
"Vascular Medicine new patient visit    Referred by Nancy Rainey CNP      History of Present Illness:  Parminder Knox is a/an 50 y.o. man with paroxysmal atrial fibrillation status post ablation referred for varicose veins.     Has noted a tender lumpy vein on the right shin for about five years. He has associated aching pain of the leg and occasional swelling. He has worn compression socks intermittently for years with no benefit.    Mom has varicose veins. She has undergone multiple vein procedures      Medical History[1]  Surgical History[2]  Social History[3]  Family History[4]  Current Medications[5]    Physical Examination:  Blood pressure 123/84, pulse 82, height 1.93 m (6' 4\"), weight 112 kg (247 lb), SpO2 95%.  General: well-developed, well-nourished, no distress  Head: normocephalic, atraumatic  Eyes: PERRL, anicteric sclerae, no conjunctival injection  ENT: normal oropharynx  Neck: supple, no carotid bruits, no JVD  Lungs: normal respiratory effort, clear to auscultation bilaterally  Heart: regular, normal S1 and S2, no murmurs, rubs or gallops  Abdomen: normal active bowel sounds, soft, non-distended, non-tender  Extremities: no cyanosis or clubbing  Vascular: no edema, pulses 2+ and symmetric, right shin dilated cluster of varicose veins that track proximally to the knee   Musculoskeletal: no deformities  Neurological: alert and oriented, no gross neurological deficits  Psychological: normal mood and affect   Skin: warm and dry, no rashes or lesions        Pertinent Labs:    Pertinent Imaging:    Diagnoses and all orders for this visit:  Varicose veins of right lower extremity with pain (Primary)  -     Referral to Vascular Surgery; Future  -     Compression Stockings 20-30 mmHg  Other orders  -     Referral to Vascular Medicine      Ashly Jaime MD, MS           [1] No past medical history on file.  [2]   Past Surgical History:  Procedure Laterality Date    CARDIAC ELECTROPHYSIOLOGY " PROCEDURE Right 2/11/2025    Procedure: Ablation A-Fib Paroxysmal;  Surgeon: Tereso Santamaria MD;  Location: [place] [address] Cardiac Cath Lab;  Service: Electrophysiology;  Laterality: Right;  CARTO/ANY EP LAB  GENERAL ANESTHESIA  PATIENT HAS [facility], PATIENT WILL STAY OVERNIGHT DUE TO INSURANCE    OTHER SURGICAL HISTORY  05/11/2020    Knee arthroscopy   [3]   Social History  Tobacco Use    Smoking status: Never    Smokeless tobacco: Never   [4] No family history on file.  [5]   Current Outpatient Medications   Medication Sig Dispense Refill    apixaban (Eliquis) 5 mg tablet Take 1 tablet (5 mg) by mouth every 12 hours. 180 tablet 3    ciprofloxacin (Ciloxan) 0.3 % ophthalmic solution INSTILL 1 DROP INTO RIGHT EYE 4 TIMES A DAY AS DIRECTED      colchicine 0.6 mg tablet Take 1 tablet (0.6 mg) by mouth 2 times a day as needed for muscle/joint pain (chest discomfort). 60 tablet 11    metoprolol succinate XL (Toprol-XL) 25 mg 24 hr tablet Take 1 tablet (25 mg) by mouth once daily. 90 tablet 3    predniSONE (Deltasone) 5 mg tablet Take 1 tablet (5 mg) by mouth once daily.       No current facility-administered medications for this visit.

## 2025-08-05 LAB
ANION GAP SERPL CALCULATED.4IONS-SCNC: 11 MMOL/L (CALC) (ref 7–17)
BUN SERPL-MCNC: 17 MG/DL (ref 7–25)
BUN/CREAT SERPL: NORMAL (CALC) (ref 6–22)
CALCIUM SERPL-MCNC: 9.5 MG/DL (ref 8.6–10.3)
CHLORIDE SERPL-SCNC: 104 MMOL/L (ref 98–110)
CO2 SERPL-SCNC: 26 MMOL/L (ref 20–32)
CREAT SERPL-MCNC: 0.94 MG/DL (ref 0.7–1.3)
EGFRCR SERPLBLD CKD-EPI 2021: 98 ML/MIN/1.73M2
ERYTHROCYTE [DISTWIDTH] IN BLOOD BY AUTOMATED COUNT: 12.9 % (ref 11–15)
GLUCOSE SERPL-MCNC: 83 MG/DL (ref 65–99)
HCT VFR BLD AUTO: 47.6 % (ref 38.5–50)
HGB BLD-MCNC: 15.8 G/DL (ref 13.2–17.1)
MCH RBC QN AUTO: 30 PG (ref 27–33)
MCHC RBC AUTO-ENTMCNC: 33.2 G/DL (ref 32–36)
MCV RBC AUTO: 90.5 FL (ref 80–100)
PLATELET # BLD AUTO: 248 THOUSAND/UL (ref 140–400)
PMV BLD REES-ECKER: 11 FL (ref 7.5–12.5)
POTASSIUM SERPL-SCNC: 4.3 MMOL/L (ref 3.5–5.3)
RBC # BLD AUTO: 5.26 MILLION/UL (ref 4.2–5.8)
SODIUM SERPL-SCNC: 141 MMOL/L (ref 135–146)
WBC # BLD AUTO: 7.2 THOUSAND/UL (ref 3.8–10.8)

## 2025-08-06 ENCOUNTER — ANESTHESIA EVENT (OUTPATIENT)
Dept: CARDIOLOGY | Facility: HOSPITAL | Age: 50
End: 2025-08-06
Payer: MEDICARE

## 2025-08-06 NOTE — ANESTHESIA PREPROCEDURE EVALUATION
Patient: Parminder Knox    Procedure Information       Date/Time: 2530    Procedure: Ablation A-Fib Paroxysmal - ENSITE/ANY EP LAB  GENERAL ANESTHESIA    [place] [facility] [place] [facility] [place] [facility] [place] [facility] Cardiac Cath Lab    Providers: Tereso Santamaria MD          Parminder Knox is a 50 year old male with history significant for hyperlipidemia, vasovagal episodes, and pAfib s/p prior ablation (2025), who is scheduled for Ablation A-Fib Paroxysmal.     PONV following prior ablation.    Appropriately NPO. Risks/ benefits/ alternatives of anesthetic plan discussed. All questions invited and answered. Patient agrees to proceed as planned and consents to blood transfusion as well as all necessary procedures and invasive monitors.      Relevant Problems   Cardiac   (+) Atrial fibrillation (Multi)   (+) Mixed hyperlipidemia   (+) Paroxysmal atrial fibrillation (Multi)   (+) Paroxysmal supraventricular tachycardia       Clinical information reviewed:                   NPO Detail:  No data recorded     Physical Exam    Airway  Mallampati: II  TM distance: >3 FB  Neck ROM: full  Mouth openin finger widths     Cardiovascular   Rhythm: regular  Rate: normal     Dental    Pulmonary Breath sounds clear to auscultation     Abdominal            Anesthesia Plan    History of general anesthesia?: yes  History of complications of general anesthesia?: no    ASA 3     general     intravenous induction   Postoperative pain plan includes opioids.  Anesthetic plan and risks discussed with patient.  Use of blood products discussed with patient who consented to blood products.    Plan discussed with CAA.

## 2025-08-07 ENCOUNTER — HOSPITAL ENCOUNTER (OUTPATIENT)
Facility: HOSPITAL | Age: 50
Setting detail: OUTPATIENT SURGERY
Discharge: HOME | End: 2025-08-07
Attending: INTERNAL MEDICINE | Admitting: INTERNAL MEDICINE
Payer: MEDICARE

## 2025-08-07 ENCOUNTER — ANESTHESIA (OUTPATIENT)
Dept: CARDIOLOGY | Facility: HOSPITAL | Age: 50
End: 2025-08-07
Payer: MEDICARE

## 2025-08-07 VITALS
RESPIRATION RATE: 16 BRPM | WEIGHT: 247 LBS | TEMPERATURE: 97.3 F | DIASTOLIC BLOOD PRESSURE: 100 MMHG | OXYGEN SATURATION: 95 % | SYSTOLIC BLOOD PRESSURE: 172 MMHG | HEIGHT: 76 IN | HEART RATE: 78 BPM | BODY MASS INDEX: 30.08 KG/M2

## 2025-08-07 DIAGNOSIS — I48.0 PAROXYSMAL ATRIAL FIBRILLATION (MULTI): ICD-10-CM

## 2025-08-07 LAB
ABO GROUP (TYPE) IN BLOOD: NORMAL
ABO GROUP (TYPE) IN BLOOD: NORMAL
ACT BLD: 225 SEC (ref 82–174)
ACT BLD: 326 SEC (ref 82–174)
ACT BLD: 361 SEC (ref 82–174)
ACT BLD: 365 SEC (ref 82–174)
ANTIBODY SCREEN: NORMAL
RH FACTOR (ANTIGEN D): NORMAL
RH FACTOR (ANTIGEN D): NORMAL

## 2025-08-07 PROCEDURE — 36620 INSERTION CATHETER ARTERY: CPT | Performed by: ANESTHESIOLOGY

## 2025-08-07 PROCEDURE — 7100000009 HC PHASE TWO TIME - INITIAL BASE CHARGE: Performed by: INTERNAL MEDICINE

## 2025-08-07 PROCEDURE — C1766 INTRO/SHEATH,STRBLE,NON-PEEL: HCPCS | Performed by: INTERNAL MEDICINE

## 2025-08-07 PROCEDURE — 85347 COAGULATION TIME ACTIVATED: CPT | Performed by: INTERNAL MEDICINE

## 2025-08-07 PROCEDURE — 2500000002 HC RX 250 W HCPCS SELF ADMINISTERED DRUGS (ALT 637 FOR MEDICARE OP, ALT 636 FOR OP/ED): Performed by: ANESTHESIOLOGY

## 2025-08-07 PROCEDURE — C1760 CLOSURE DEV, VASC: HCPCS | Performed by: INTERNAL MEDICINE

## 2025-08-07 PROCEDURE — 7100000002 HC RECOVERY ROOM TIME - EACH INCREMENTAL 1 MINUTE: Performed by: INTERNAL MEDICINE

## 2025-08-07 PROCEDURE — 3700000002 HC GENERAL ANESTHESIA TIME - EACH INCREMENTAL 1 MINUTE: Performed by: INTERNAL MEDICINE

## 2025-08-07 PROCEDURE — 36415 COLL VENOUS BLD VENIPUNCTURE: CPT | Performed by: INTERNAL MEDICINE

## 2025-08-07 PROCEDURE — 93656 COMPRE EP EVAL ABLTJ ATR FIB: CPT | Performed by: INTERNAL MEDICINE

## 2025-08-07 PROCEDURE — G0269 OCCLUSIVE DEVICE IN VEIN ART: HCPCS | Performed by: INTERNAL MEDICINE

## 2025-08-07 PROCEDURE — A93656 PR EPHYS EVL TRNSPTL TX ATRIAL FIB ISOLAT PULM VEIN: Performed by: ANESTHESIOLOGY

## 2025-08-07 PROCEDURE — 2780000003 HC OR 278 NO HCPCS: Performed by: INTERNAL MEDICINE

## 2025-08-07 PROCEDURE — 2500000004 HC RX 250 GENERAL PHARMACY W/ HCPCS (ALT 636 FOR OP/ED): Performed by: ANESTHESIOLOGIST ASSISTANT

## 2025-08-07 PROCEDURE — C1759 CATH, INTRA ECHOCARDIOGRAPHY: HCPCS | Performed by: INTERNAL MEDICINE

## 2025-08-07 PROCEDURE — 7100000001 HC RECOVERY ROOM TIME - INITIAL BASE CHARGE: Performed by: INTERNAL MEDICINE

## 2025-08-07 PROCEDURE — 85347 COAGULATION TIME ACTIVATED: CPT

## 2025-08-07 PROCEDURE — 86901 BLOOD TYPING SEROLOGIC RH(D): CPT | Performed by: INTERNAL MEDICINE

## 2025-08-07 PROCEDURE — A93656 PR EPHYS EVL TRNSPTL TX ATRIAL FIB ISOLAT PULM VEIN: Performed by: ANESTHESIOLOGIST ASSISTANT

## 2025-08-07 PROCEDURE — 2720000007 HC OR 272 NO HCPCS: Performed by: INTERNAL MEDICINE

## 2025-08-07 PROCEDURE — 2500000004 HC RX 250 GENERAL PHARMACY W/ HCPCS (ALT 636 FOR OP/ED): Performed by: INTERNAL MEDICINE

## 2025-08-07 PROCEDURE — C1731 CATH, EP, 20 OR MORE ELEC: HCPCS | Performed by: INTERNAL MEDICINE

## 2025-08-07 PROCEDURE — 76937 US GUIDE VASCULAR ACCESS: CPT | Performed by: INTERNAL MEDICINE

## 2025-08-07 PROCEDURE — 3700000001 HC GENERAL ANESTHESIA TIME - INITIAL BASE CHARGE: Performed by: INTERNAL MEDICINE

## 2025-08-07 RX ORDER — FENTANYL CITRATE 50 UG/ML
12.5 INJECTION, SOLUTION INTRAMUSCULAR; INTRAVENOUS EVERY 5 MIN PRN
Refills: 0 | Status: CANCELLED | OUTPATIENT
Start: 2025-08-07

## 2025-08-07 RX ORDER — OXYCODONE HYDROCHLORIDE 5 MG/1
5 TABLET ORAL EVERY 4 HOURS PRN
Refills: 0 | Status: CANCELLED | OUTPATIENT
Start: 2025-08-07

## 2025-08-07 RX ORDER — HYDRALAZINE HYDROCHLORIDE 20 MG/ML
5 INJECTION INTRAMUSCULAR; INTRAVENOUS ONCE AS NEEDED
Status: CANCELLED | OUTPATIENT
Start: 2025-08-07

## 2025-08-07 RX ORDER — PROPOFOL 10 MG/ML
INJECTION, EMULSION INTRAVENOUS AS NEEDED
Status: DISCONTINUED | OUTPATIENT
Start: 2025-08-07 | End: 2025-08-07

## 2025-08-07 RX ORDER — NORETHINDRONE AND ETHINYL ESTRADIOL 0.5-0.035
KIT ORAL CONTINUOUS PRN
Status: DISCONTINUED | OUTPATIENT
Start: 2025-08-07 | End: 2025-08-07

## 2025-08-07 RX ORDER — APREPITANT 40 MG/1
CAPSULE ORAL AS NEEDED
Status: DISCONTINUED | OUTPATIENT
Start: 2025-08-07 | End: 2025-08-07

## 2025-08-07 RX ORDER — ROCURONIUM BROMIDE 10 MG/ML
INJECTION, SOLUTION INTRAVENOUS AS NEEDED
Status: DISCONTINUED | OUTPATIENT
Start: 2025-08-07 | End: 2025-08-07

## 2025-08-07 RX ORDER — PROTAMINE SULFATE 10 MG/ML
INJECTION, SOLUTION INTRAVENOUS AS NEEDED
Status: DISCONTINUED | OUTPATIENT
Start: 2025-08-07 | End: 2025-08-07

## 2025-08-07 RX ORDER — FENTANYL CITRATE 50 UG/ML
INJECTION, SOLUTION INTRAMUSCULAR; INTRAVENOUS AS NEEDED
Status: DISCONTINUED | OUTPATIENT
Start: 2025-08-07 | End: 2025-08-07

## 2025-08-07 RX ORDER — FENTANYL CITRATE 50 UG/ML
50 INJECTION, SOLUTION INTRAMUSCULAR; INTRAVENOUS EVERY 5 MIN PRN
Refills: 0 | Status: CANCELLED | OUTPATIENT
Start: 2025-08-07

## 2025-08-07 RX ORDER — ONDANSETRON HYDROCHLORIDE 2 MG/ML
INJECTION, SOLUTION INTRAVENOUS AS NEEDED
Status: DISCONTINUED | OUTPATIENT
Start: 2025-08-07 | End: 2025-08-07

## 2025-08-07 RX ORDER — HEPARIN SODIUM 1000 [USP'U]/ML
INJECTION, SOLUTION INTRAVENOUS; SUBCUTANEOUS AS NEEDED
Status: DISCONTINUED | OUTPATIENT
Start: 2025-08-07 | End: 2025-08-07 | Stop reason: HOSPADM

## 2025-08-07 RX ORDER — FAMOTIDINE 10 MG/ML
INJECTION, SOLUTION INTRAVENOUS AS NEEDED
Status: DISCONTINUED | OUTPATIENT
Start: 2025-08-07 | End: 2025-08-07

## 2025-08-07 RX ORDER — LABETALOL HYDROCHLORIDE 5 MG/ML
5 INJECTION, SOLUTION INTRAVENOUS EVERY 10 MIN PRN
Status: CANCELLED | OUTPATIENT
Start: 2025-08-07

## 2025-08-07 RX ORDER — ALBUTEROL SULFATE 0.83 MG/ML
2.5 SOLUTION RESPIRATORY (INHALATION) ONCE AS NEEDED
Status: CANCELLED | OUTPATIENT
Start: 2025-08-07

## 2025-08-07 RX ORDER — ATROPINE SULFATE 1 MG/ML
INJECTION, SOLUTION INTRAVENOUS AS NEEDED
Status: DISCONTINUED | OUTPATIENT
Start: 2025-08-07 | End: 2025-08-07

## 2025-08-07 RX ORDER — PHENYLEPHRINE 10 MG/250 ML(40 MCG/ML)IN 0.9 % SOD.CHLORIDE INTRAVENOUS
CONTINUOUS PRN
Status: DISCONTINUED | OUTPATIENT
Start: 2025-08-07 | End: 2025-08-07

## 2025-08-07 RX ORDER — SODIUM CHLORIDE, SODIUM LACTATE, POTASSIUM CHLORIDE, CALCIUM CHLORIDE 600; 310; 30; 20 MG/100ML; MG/100ML; MG/100ML; MG/100ML
100 INJECTION, SOLUTION INTRAVENOUS CONTINUOUS
Status: CANCELLED | OUTPATIENT
Start: 2025-08-07 | End: 2025-08-08

## 2025-08-07 RX ORDER — PHENYLEPHRINE HCL IN 0.9% NACL 0.4MG/10ML
SYRINGE (ML) INTRAVENOUS AS NEEDED
Status: DISCONTINUED | OUTPATIENT
Start: 2025-08-07 | End: 2025-08-07

## 2025-08-07 RX ORDER — OXYCODONE HYDROCHLORIDE 10 MG/1
10 TABLET ORAL EVERY 4 HOURS PRN
Refills: 0 | Status: CANCELLED | OUTPATIENT
Start: 2025-08-07

## 2025-08-07 RX ORDER — MIDAZOLAM HYDROCHLORIDE 2 MG/2ML
INJECTION, SOLUTION INTRAMUSCULAR; INTRAVENOUS AS NEEDED
Status: DISCONTINUED | OUTPATIENT
Start: 2025-08-07 | End: 2025-08-07

## 2025-08-07 RX ORDER — LIDOCAINE IN NACL,ISO-OSMOT/PF 30 MG/3 ML
0.1 SYRINGE (ML) INJECTION ONCE
Status: CANCELLED | OUTPATIENT
Start: 2025-08-07 | End: 2025-08-07

## 2025-08-07 RX ORDER — LIDOCAINE HCL/PF 100 MG/5ML
SYRINGE (ML) INTRAVENOUS AS NEEDED
Status: DISCONTINUED | OUTPATIENT
Start: 2025-08-07 | End: 2025-08-07

## 2025-08-07 RX ORDER — FENTANYL CITRATE 50 UG/ML
25 INJECTION, SOLUTION INTRAMUSCULAR; INTRAVENOUS EVERY 5 MIN PRN
Refills: 0 | Status: CANCELLED | OUTPATIENT
Start: 2025-08-07

## 2025-08-07 RX ORDER — ONDANSETRON HYDROCHLORIDE 2 MG/ML
4 INJECTION, SOLUTION INTRAVENOUS ONCE AS NEEDED
Status: CANCELLED | OUTPATIENT
Start: 2025-08-07

## 2025-08-07 RX ORDER — PROCHLORPERAZINE EDISYLATE 5 MG/ML
5 INJECTION INTRAMUSCULAR; INTRAVENOUS ONCE AS NEEDED
Status: CANCELLED | OUTPATIENT
Start: 2025-08-07

## 2025-08-07 RX ORDER — GLYCOPYRROLATE 0.2 MG/ML
INJECTION INTRAMUSCULAR; INTRAVENOUS AS NEEDED
Status: DISCONTINUED | OUTPATIENT
Start: 2025-08-07 | End: 2025-08-07

## 2025-08-07 RX ADMIN — ROCURONIUM BROMIDE 10 MG: 10 INJECTION INTRAVENOUS at 10:21

## 2025-08-07 RX ADMIN — ROCURONIUM BROMIDE 10 MG: 10 INJECTION INTRAVENOUS at 09:44

## 2025-08-07 RX ADMIN — SODIUM CHLORIDE, SODIUM LACTATE, POTASSIUM CHLORIDE, AND CALCIUM CHLORIDE: 600; 310; 30; 20 INJECTION, SOLUTION INTRAVENOUS at 08:19

## 2025-08-07 RX ADMIN — FENTANYL CITRATE 100 MCG: 50 INJECTION, SOLUTION INTRAMUSCULAR; INTRAVENOUS at 08:27

## 2025-08-07 RX ADMIN — ATROPINE SULFATE 0.5 MG: 1 INJECTION, SOLUTION INTRAMUSCULAR; INTRAVENOUS; SUBCUTANEOUS at 09:54

## 2025-08-07 RX ADMIN — Medication 200 MCG: at 09:09

## 2025-08-07 RX ADMIN — SUGAMMADEX 300 MG: 100 INJECTION, SOLUTION INTRAVENOUS at 11:01

## 2025-08-07 RX ADMIN — PHENYLEPHRINE-NACL IV SOLUTION 10 MG/250ML-0.9% 1 MCG/KG/MIN: 10-0.9/25 SOLUTION at 09:07

## 2025-08-07 RX ADMIN — LIDOCAINE HYDROCHLORIDE 100 MG: 20 INJECTION, SOLUTION INTRAVENOUS at 08:27

## 2025-08-07 RX ADMIN — Medication 80 MCG: at 10:07

## 2025-08-07 RX ADMIN — GLYCOPYRROLATE 0.1 MG: 0.2 SOLUTION INTRAMUSCULAR; INTRAVENOUS at 09:19

## 2025-08-07 RX ADMIN — ROCURONIUM BROMIDE 20 MG: 10 INJECTION INTRAVENOUS at 09:00

## 2025-08-07 RX ADMIN — ONDANSETRON 4 MG: 2 INJECTION INTRAMUSCULAR; INTRAVENOUS at 10:58

## 2025-08-07 RX ADMIN — ROCURONIUM BROMIDE 50 MG: 10 INJECTION INTRAVENOUS at 08:28

## 2025-08-07 RX ADMIN — Medication 120 MCG: at 09:06

## 2025-08-07 RX ADMIN — PROPOFOL 200 MG: 10 INJECTION, EMULSION INTRAVENOUS at 08:27

## 2025-08-07 RX ADMIN — PROTAMINE SULFATE 40 MG: 10 INJECTION, SOLUTION INTRAVENOUS at 10:46

## 2025-08-07 RX ADMIN — MIDAZOLAM HYDROCHLORIDE 2 MG: 2 INJECTION, SOLUTION INTRAMUSCULAR; INTRAVENOUS at 08:26

## 2025-08-07 RX ADMIN — Medication 80 MCG: at 09:01

## 2025-08-07 RX ADMIN — ROCURONIUM BROMIDE 10 MG: 10 INJECTION INTRAVENOUS at 09:25

## 2025-08-07 RX ADMIN — FAMOTIDINE 20 MG: 10 INJECTION INTRAVENOUS at 08:35

## 2025-08-07 RX ADMIN — ROCURONIUM BROMIDE 10 MG: 10 INJECTION INTRAVENOUS at 10:20

## 2025-08-07 RX ADMIN — APREPITANT 40 MG: 40 CAPSULE ORAL at 07:20

## 2025-08-07 NOTE — ANESTHESIA PROCEDURE NOTES
Airway  Date/Time: 8/7/2025 8:32 AM  Reason: elective    Airway not difficult    Staffing  Performed: AMOR   Authorized by: Sara Soto MD    Performed by: AMOR Mello  Patient location during procedure: OR    Patient Condition  Indications for airway management: anesthesia and airway protection  Patient position: sniffing  Sedation level: deep     Final Airway Details   Preoxygenated: yes  Final airway type: endotracheal airway  Successful airway: ETT  Cuffed: yes   Successful intubation technique: direct laryngoscopy  Adjuncts used in placement: intubating stylet  Endotracheal tube insertion site: oral  Blade: Marisol  Blade size: #4  ETT size (mm): 7.5  Cormack-Lehane Classification: grade IIb - view of arytenoids or posterior of glottis only  Placement verified by: chest auscultation and capnometry   Measured from: lips  ETT to lips (cm): 23  Number of attempts at approach: 1    Additional Comments  Grade 2b view with cricoid pressure. ETT passed easily and atraumatically. Teeth and soft tissue in preoperative condition.

## 2025-08-07 NOTE — H&P
"History Of Present Illness  Parminder Knox is a 50 y.o. male with PMH of paroxysmal Afib (s/p RFA with PVI and adjuvant lesions by Dr. Santamaria on 2/11/25), left Atach (s/p left posterior wall ablation in 2009 at [facility] and epicardial PAC/AT ablation attempt in 2010 that was aborted due to no PAC/AT during study) who is here for re-do afib ablation.     Pt felt better after last ablation for short period of time, then had recurrence of his Afib with occasional chest pain and palpitations. No LOC or leg swelling.      Past Medical History  Medical History[1]    Surgical History  Surgical History[2]     Social History  He reports that he has never smoked. He has never used smokeless tobacco. No history on file for alcohol use and drug use.    Family History  Family History[3]     Allergies  Atorvastatin and House dust mite    Review of Systems  Constitutional:  Negative for chills and fever.   HENT:  Negative for congestion and rhinorrhea.    Eyes:  Negative for pain and redness.   Respiratory:  Negative for shortness of breath and wheezing.    Cardiovascular:  POSITIVE FOR occasional chest pain and palpitations.   Gastrointestinal:  Negative for abdominal pain, constipation and diarrhea.   Genitourinary:  Negative for dysuria and hematuria.   Musculoskeletal:  Negative for back pain and neck pain.   Neurological:  Negative for dizziness and headaches.   Psychiatric/Behavioral:  Negative for agitation and confusion.         Physical Exam  Gen: awake and alert, AAOx3  HEENT: normocephalic.  CV: RRR. No murmurs, gallops, rubs.   Pulm: clear to auscultation bilaterally. No coarse lung sounds  Abd: soft, non-distended. Normal active bowel sounds  Ext: warm and well-perfused. No LE edema.   Psych: appropriate affect  Neuro: grossly intact sensation and motor functions.      Last Recorded Vitals  Blood pressure (!) 172/100, pulse 78, temperature 36.3 °C (97.3 °F), resp. rate 16, height 1.93 m (6' 4\"), weight 112 kg (247 " lb), SpO2 95%.    Relevant Results         Assessment & Plan    Parminder Knox is a 50 y.o. male with PMH of paroxysmal Afib (s/p RFA with PVI and adjuvant lesions by Dr. Santamaria on 2/11/25), left Atach (s/p left posterior wall ablation in 2009 at [facility] and epicardial PAC/AT ablation attempt in 2010 that was aborted due to no PAC/AT during study) who is here for re-do afib ablation.     Plan: Re-do radiofrequency catheter ablation for Afib under general anesthesia.     I spent 30 minutes in the professional and overall care of this patient.      Milli Mahajan MD         [1] No past medical history on file.  [2]   Past Surgical History:  Procedure Laterality Date    CARDIAC ELECTROPHYSIOLOGY PROCEDURE Right 2/11/2025    Procedure: Ablation A-Fib Paroxysmal;  Surgeon: Tereso Santamaria MD;  Location: [place] [address] Cardiac Cath Lab;  Service: Electrophysiology;  Laterality: Right;  CARTO/ANY EP LAB  GENERAL ANESTHESIA  PATIENT HAS [facility], PATIENT WILL STAY OVERNIGHT DUE TO INSURANCE    OTHER SURGICAL HISTORY  05/11/2020    Knee arthroscopy   [3] No family history on file.

## 2025-08-07 NOTE — ANESTHESIA POSTPROCEDURE EVALUATION
Patient: Parminder Knox    Procedure Summary       Date: 08/07/25 Room / [place] [facility] [place] [facility] [place] [facility] [place] [facility] Cardiac Cath Lab    Anesthesia Start: 0815 Anesthesia Stop: 1121    Procedure: Ablation A-Fib Paroxysmal Diagnosis: Paroxysmal atrial fibrillation (Multi)    Providers: Tereso Santamaria MD Responsible Provider: Sara Soto MD    Anesthesia Type: general ASA Status: 3            Anesthesia Type: general    Vitals Value Taken Time   /71 08/07/25 11:23   Temp 36.4 08/07/25 11:23   Pulse 71 08/07/25 11:23   Resp 15 08/07/25 11:23   SpO2 99 08/07/25 11:23       Anesthesia Post Evaluation    Patient location during evaluation: PACU  Patient participation: complete - patient participated  Level of consciousness: awake  Pain management: adequate  Airway patency: patent  Cardiovascular status: acceptable  Respiratory status: acceptable  Hydration status: acceptable  Postoperative Nausea and Vomiting: none        There were no known notable events for this encounter.

## 2025-08-07 NOTE — ANESTHESIA PROCEDURE NOTES
Arterial Line:    Date/Time: 8/7/2025 8:30 AM    Staffing  Performed: attending   Authorized by: Sara Soto MD    Performed by: AMOR Mello    An arterial line was placed.   Procedure performed using surface landmarks.in the OR for the following indication(s): continuous blood pressure monitoring, blood sampling needed and Cardiologist request.    A 20 gauge (size), 2.5 cm (length), Angiocath (type) catheter was placed into the Left radial artery, secured by Tegaderm,   Seldinger technique not used.        Additional notes:  No apparent complications.

## 2025-08-07 NOTE — DISCHARGE INSTRUCTIONS
INSTRUCTIONS AFTER ABLATION PROCEDURE:    * You will need to continue blood thinner (Eliquis every 12 hours) until instructed otherwise. It is important not to interrupt blood thinner for any reason (other than an emergency) during the first 30 days after ablation.    * Depending on the type of energy used for ablation (thermal vs non-thermal), you may be sent home on Pantoprazole (a heartburn medicine) for 4 weeks to protect the esophagus as it can become irritated with ablation using thermal energy. It is very important that you take this medication if prescribed.     * All other medications will generally remain the same unless you are told otherwise.  Resume taking your home medications today (including blood thinner) as listed on the discharge instructions.    * In the first week post-ablation you should take it easy. No heavy lifting or heavy exercise, no treadmill. You can use the stairs if needed but go slowly and minimize the number of times up and down.    * Some minor bruising is common at each groin access site with minor soreness as if you had banged the area. Bruising may occasionally be seen to extend down the leg. This is normal as is an occasional small quarter sized bump in the area. If larger swelling or more significant pain occurs at the area, please contact the office or go the nearest Emergency Room.    * You may have some minor chest pain for the next week or so. The pain will often worsen with a deep breath and be better when leaning forward. This is pericardial chest pain from the ablation and is generally not of concern. It should resolve within a week although it might increase for a day or so after the ablation.    * If you develop unexplained fevers exceeding 100 degrees anytime within the first 3 weeks post-ablation, you need to contact the office. Low grade fevers of around 99 degrees are common in the first day or so post-ablation.    * Atrial fibrillation (AFib) can recur in all  patients who undergo this ablation for up to 4-8 weeks post-ablation. The ablation itself can cause inflammation (pericarditis) in the atria and this can cause AFib. Some patients will actually experience an increased amount of atrial arrhythmia early after ablation. Approximately 1/3 of patients will have this early recurrence of AFib. Medications should be continued and your heart rate controlled. Nothing else needs be done initially except waiting as in many cases these episodes of AFib will prove self limited.    * Continue to follow up with your primary care physician, primary cardiologist, and any other specialists you normally see.    * No driving for 2 days post procedure (IF you were driving prior to procedure)    *Diet: Heart healthy    Call Provider If:  Breathing faster than normal.     Fever of 100.4 F (38 C) or higher.     Chills.     Any new concerning symptoms.     Passing out.     Patient Instructions, Next 24 hours:  DO NOT drive a car, operate machinery or power tools.  It is recommended that a responsible adult be with you for the first 24 hours.     DO NOT drink any alcoholic drinks or take any non-prescriptive medications that contain alcohol for the first 24 hours.     DO NOT make any important decisions for the first 24 hours.    Activity:  You are advised to go directly home from the hospital.     DO NOT lift anything heavier than 10 pounds for one week, this allows for proper healing of the groin.     No excessive exercise or treadmill use for one week. You may walk and do stairs, slowly.     No sexual activities for 24 hours after you arrive home.    Wound Care:  If slight bleeding should occur at groin site, lie down and have someone apply firm pressure just above the puncture site for 5 minutes.  If it continues or is profuse, call 911. Always notify your doctor if bleeding occurs.     Keep site clean and dry. Let air dry or you may use a simple bandaid.     Gently cleanse the puncture  site in your groin with soap and water only.     You may experience some tenderness, bruising or minimal inflammation.  If you have any concerns, you may contact the EP Lab or if any of these symptoms become excessive, contact your electrophysiologist or go to the emergency room.     No tub baths, soaking, hot tubs, or swimming for one week.     May shower the next day after your procedure.    Other Instructions:  If you have any questions about the effects of the sedative drugs or groin care, please call the physician who performed your procedure.    FOLLOW UP:  1) Primary care physician 2 weeks--call to schedule    2) Nancy Rainey CNP ( Electrophysiology) 1 month after ablation as scheduled

## 2025-08-07 NOTE — ANESTHESIA PROCEDURE NOTES
Peripheral IV  Date/Time: 8/7/2025 8:45 AM  Inserted by: AMOR Mello    Placement  Needle size: 16 G  Laterality: right  Location: wrist  Local anesthetic: none  Site prep: alcohol  Technique: anatomical landmarks  Attempts: 1

## 2025-09-04 ENCOUNTER — OFFICE VISIT (OUTPATIENT)
Dept: VASCULAR SURGERY | Facility: CLINIC | Age: 50
End: 2025-09-04
Payer: MEDICARE

## 2025-09-04 VITALS
SYSTOLIC BLOOD PRESSURE: 124 MMHG | OXYGEN SATURATION: 97 % | HEART RATE: 71 BPM | DIASTOLIC BLOOD PRESSURE: 86 MMHG | HEIGHT: 76 IN | BODY MASS INDEX: 30.56 KG/M2 | WEIGHT: 251 LBS

## 2025-09-04 DIAGNOSIS — I83.811 VARICOSE VEINS OF LEG WITH PAIN, RIGHT: Primary | ICD-10-CM

## 2025-09-04 PROCEDURE — 3008F BODY MASS INDEX DOCD: CPT | Performed by: NURSE PRACTITIONER

## 2025-09-04 PROCEDURE — 99203 OFFICE O/P NEW LOW 30 MIN: CPT | Performed by: NURSE PRACTITIONER

## 2025-09-04 PROCEDURE — 99213 OFFICE O/P EST LOW 20 MIN: CPT | Performed by: NURSE PRACTITIONER

## 2025-09-04 ASSESSMENT — PAIN SCALES - GENERAL: PAINLEVEL_OUTOF10: 4

## 2025-09-04 ASSESSMENT — ENCOUNTER SYMPTOMS
LOSS OF SENSATION IN FEET: 0
DEPRESSION: 0
OCCASIONAL FEELINGS OF UNSTEADINESS: 0

## 2025-09-05 ASSESSMENT — ENCOUNTER SYMPTOMS
PSYCHIATRIC NEGATIVE: 1
PALPITATIONS: 0
HEMATOLOGIC/LYMPHATIC NEGATIVE: 1
SHORTNESS OF BREATH: 0
MUSCULOSKELETAL NEGATIVE: 1
EYES NEGATIVE: 1
CHEST TIGHTNESS: 0
ALLERGIC/IMMUNOLOGIC NEGATIVE: 1
GASTROINTESTINAL NEGATIVE: 1
ENDOCRINE NEGATIVE: 1
NEUROLOGICAL NEGATIVE: 1
UNEXPECTED WEIGHT CHANGE: 0
APPETITE CHANGE: 0
ACTIVITY CHANGE: 0

## (undated) DEVICE — PAD, ELECTRODE DEFIB PADPRO ADULT STRL W/ADAPTER

## (undated) DEVICE — CATHETER, ACUSON ACUNAV ULTRASOUND, 8FR 90CM  (REPROCESSED)

## (undated) DEVICE — CLOSURE SYSTEM, VASCULAR, MVP 6-12FR, VENOUS

## (undated) DEVICE — CONNECTOR, CATHETER, RESPONSE, RED HEX

## (undated) DEVICE — CATHETER, EPL, 7FR DUO, 2/8 2M 95CM, STEERABLE LGCRL

## (undated) DEVICE — Device

## (undated) DEVICE — CATHETER, DIAGNOSTIC, SOUNDSTAR ECO SMS, 8FR

## (undated) DEVICE — NEEDLE, NRG TRANSSEPTAL, 98CM, CURVE C0

## (undated) DEVICE — INTRODUCER, SHEATH, FAST-CATH, 8FR X 12CM, C-LOCK

## (undated) DEVICE — CATHETER, PENTARAY, NAV ECO, 7FR, 2-6-2 SPACING, D CURVE

## (undated) DEVICE — CABLE, CARTO 3 SYSTEM, ECO INTERFACE, 34-PIN, SPLIT HANDLE (REPROCESSED)

## (undated) DEVICE — INTRODUCER, HEMOSTASIS, STR/J .038 IN, 8.5FR 12CM

## (undated) DEVICE — SHEATH, STEERABLE, SUREFLEX, MEDIUM CURVE

## (undated) DEVICE — CATHETER, THERMOCOOL SMART TOUCH, SF, D-F CURVE

## (undated) DEVICE — CABLE, CONNECTOR, 10FT

## (undated) DEVICE — CABLE, 34 HYP, 34 LEMO, 10FT, SMART TOUCH (REPROCESS)

## (undated) DEVICE — CABLE, CONNECTOR, DAIG RESPONSE, 210CM, BLACK

## (undated) DEVICE — CABLE, CONNECTOR, 10FT (REPROCESSED)

## (undated) DEVICE — PATCHES, EXTERNAL REFERENCE, CARTO3

## (undated) DEVICE — TUBING SET, IRRIGATION, SMARTABLATE